# Patient Record
Sex: MALE | Race: WHITE | NOT HISPANIC OR LATINO | Employment: FULL TIME | URBAN - METROPOLITAN AREA
[De-identification: names, ages, dates, MRNs, and addresses within clinical notes are randomized per-mention and may not be internally consistent; named-entity substitution may affect disease eponyms.]

---

## 2024-02-10 ENCOUNTER — HOSPITAL ENCOUNTER (EMERGENCY)
Facility: MEDICAL CENTER | Age: 39
End: 2024-02-10
Attending: EMERGENCY MEDICINE

## 2024-02-10 ENCOUNTER — APPOINTMENT (OUTPATIENT)
Dept: RADIOLOGY | Facility: MEDICAL CENTER | Age: 39
End: 2024-02-10
Attending: EMERGENCY MEDICINE

## 2024-02-10 VITALS
DIASTOLIC BLOOD PRESSURE: 86 MMHG | BODY MASS INDEX: 24.18 KG/M2 | HEIGHT: 63 IN | SYSTOLIC BLOOD PRESSURE: 145 MMHG | WEIGHT: 136.47 LBS | RESPIRATION RATE: 20 BRPM | OXYGEN SATURATION: 94 % | HEART RATE: 66 BPM | TEMPERATURE: 98.8 F

## 2024-02-10 DIAGNOSIS — R51.9 ACUTE NONINTRACTABLE HEADACHE, UNSPECIFIED HEADACHE TYPE: Primary | ICD-10-CM

## 2024-02-10 LAB — GLUCOSE BLD STRIP.AUTO-MCNC: 89 MG/DL (ref 65–99)

## 2024-02-10 PROCEDURE — 82962 GLUCOSE BLOOD TEST: CPT

## 2024-02-10 PROCEDURE — 70450 CT HEAD/BRAIN W/O DYE: CPT

## 2024-02-10 PROCEDURE — 99283 EMERGENCY DEPT VISIT LOW MDM: CPT

## 2024-02-10 NOTE — ED TRIAGE NOTES
"Chief Complaint   Patient presents with    Headache     X3 hours, started off throbbing and then became sharp pain to R back of head, states numbness and tingling to all of body. +blurred vision. Denies dizziness and nausea. Denies recent injuries/medical hx.        Pt ambulated with steady gait to triage. Pt A&Ox4, on room air, (-) stroke scale in triage. Denies medication.     Pt to lobby . Pt educated on alerting staff in changes to condition. Pt verbalized understanding.     BP (!) 155/92   Pulse 76   Temp 36.3 °C (97.3 °F) (Temporal)   Resp 17   Ht 1.6 m (5' 3\")   Wt 61.9 kg (136 lb 7.4 oz)   SpO2 100%   BMI 24.17 kg/m²   s  "

## 2024-02-10 NOTE — ED PROVIDER NOTES
"  ER Provider Note    Scribed for Tonio Hampton Ii, M.d. by Praneeth Montes. 2/10/2024  3:47 AM    Primary Care Provider: No primary care provider noted    CHIEF COMPLAINT  Chief Complaint   Patient presents with    Headache     X3 hours, started off throbbing and then became sharp pain to R back of head, states numbness and tingling to all of body. +blurred vision. Denies dizziness and nausea. Denies recent injuries/medical hx.          HPI/ROS  LIMITATION TO HISTORY   Select: Language Thai,  Used [id number], [id number]  OUTSIDE HISTORIAN(S):  None    Shandra Villegas is a 38 y.o. male who presents to the ED for evaluation of headaches onset 6 PM. He was finishing work when he his pain began as a pulsing then at 7 PM his pain became constant. He notes his pain is not strong, but is a constant \"buzzing.\" He locates his pain to the right posterior of his head with associated blurred vision. He says he can still see but it looks different. No loss of vision or peripheral vision. 2 hours after his headache began to experience tingling across his entire body. He states that \"when he is walking he feels like he is not walking.\" The patient denies dizziness and nausea. The patient denies taking medication to treat his symptoms. He states he does not like to take medicine because of a pill he took 6 years ago to treat tooth pain that made him feel bad.     PAST MEDICAL HISTORY  Past Medical History:   Diagnosis Date    Patient denies medical problems      SURGICAL HISTORY  History reviewed. No pertinent surgical history.    FAMILY HISTORY  History reviewed. No pertinent family history.    SOCIAL HISTORY   reports that he has never smoked. He has never used smokeless tobacco. He reports that he does not drink alcohol and does not use drugs.    CURRENT MEDICATIONS  Previous Medications    Denies taking medication.      ALLERGIES  Patient has no known allergies.    PHYSICAL EXAM  BP (!) 144/93   Pulse 74   " "Temp 36.3 °C (97.3 °F) (Temporal)   Resp 17   Ht 1.6 m (5' 3\")   Wt 61.9 kg (136 lb 7.4 oz)   SpO2 98%   BMI 24.17 kg/m²   Physical Exam  Vitals and nursing note reviewed.   Constitutional:       Appearance: Normal appearance.   HENT:      Head: Normocephalic and atraumatic.      Comments: No reproducible tenderness at scalp     Nose: No congestion.      Mouth/Throat:      Mouth: Mucous membranes are moist.   Eyes:      Extraocular Movements: Extraocular movements intact.      Conjunctiva/sclera: Conjunctivae normal.      Pupils: Pupils are equal, round, and reactive to light.   Cardiovascular:      Rate and Rhythm: Normal rate and regular rhythm.   Pulmonary:      Effort: Pulmonary effort is normal.      Breath sounds: Normal breath sounds.   Abdominal:      General: There is no distension.   Musculoskeletal:      Cervical back: Normal range of motion. No rigidity.   Lymphadenopathy:      Cervical: No cervical adenopathy.   Neurological:      Mental Status: He is alert.      Comments: AOX4, clear speech, no aphasia, no facial droop. 5/5 strength in all extremities. Sensation intact. No truncal or peripheral ataxia.    Psychiatric:         Mood and Affect: Mood normal. Mood is not depressed.          DIAGNOSTIC STUDIES    Labs:   Results for orders placed or performed during the hospital encounter of 02/10/24   POCT glucose device results   Result Value Ref Range    POC Glucose, Blood 89 65 - 99 mg/dL      Radiology:   The attending emergency physician has independently interpreted the diagnostic imaging associated with this visit and am waiting the final reading from the radiologist.   Preliminary interpretation is a follows: CT-head no intracranial abnormality.   Radiologist interpretation:   CT-HEAD W/O   Final Result      No acute process.            COURSE & MEDICAL DECISION MAKING     ED Observation Status? No; Patient does not meet criteria for ED Observation.     Care Narrative:   3:56 AM - Patient seen " and examined at bedside. The patient is a 38 year old man who presents to the ED for evaluation of a headache onset 6 PM. The patient's neurological examination is unremarkable. The patient requests blood work, but I informed him this would likely be inconclusive. Blood sugar taken during evaluation by nursing that was normal (89). Discussed plan of care, including medication for pain and CT imaging to evaluate for any intracranial injury or abnormality. Likely benign headache but some limitations with . He insists on further testing, refuses medication for pain. Could be migraine and would be more reassuring to be able to treat as migraine and if resolved, this would be more substantial evidence that this headache is benign. Patient agrees to the plan of care. Ordered for CT-head w/o to evaluate his symptoms.      4:44 AM - Reviewed imaging at this time as noted above.     5:26 AM - I reevaluated the patient at bedside. He reports feeling improved at this time. I discussed the patient's diagnostic study results which show no evidence of intracranial injury or other abnormality. Vision checked and is acceptable. The patient was given the opportunity to ask questions and all were addressed at this time. I discussed plan for discharge and follow up as outlined below. The patient is stable for discharge at this time and will return for any new or worsening symptoms. Patient verbalizes understanding and support with my plan for discharge.      PROBLEM LIST  # Headache   -likely benign, CT normal, headache improving    DISPOSITION AND DISCUSSIONS  I have discussed management of the patient with the following physicians and CARRILLO's:  None    Discussion of management with other QHP or appropriate source(s): None     Barriers to care at this time, including but not limited to: Patient does not have established PCP.     The patient will return for new or worsening symptoms and is stable at the time of  discharge.    The patient is referred to a primary physician for blood pressure management, diabetic screening, and for all other preventative health concerns.    DISPOSITION:  Patient will be discharged home in stable condition.    FOLLOW UP:  [facility], Emergency Dept  [address] [address] [phone]    fever, worsening pain, vision loss or other serious concerns    OUTPATIENT MEDICATIONS:  There are no discharge medications for this patient.     FINAL DIANGOSIS  1. Acute nonintractable headache, unspecified headache type         I, Praneeth Montes (Norberto), am scribing for, and in the presence of, KACI Ontiveros II.    Electronically signed by: Praneeth Montes (Norberto), 2/10/2024    ITonio II, M* personally performed the services described in this documentation, as scribed by Praneeth Montes in my presence, and it is both accurate and complete.     The note accurately reflects work and decisions made by me.  Tonio Hampton II, M.D.  2/10/2024  7:13 AM

## 2024-06-05 ENCOUNTER — APPOINTMENT (OUTPATIENT)
Dept: RADIOLOGY | Facility: MEDICAL CENTER | Age: 39
End: 2024-06-05
Attending: EMERGENCY MEDICINE

## 2024-06-05 ENCOUNTER — HOSPITAL ENCOUNTER (EMERGENCY)
Facility: MEDICAL CENTER | Age: 39
End: 2024-06-05
Attending: EMERGENCY MEDICINE

## 2024-06-05 VITALS
RESPIRATION RATE: 16 BRPM | HEART RATE: 58 BPM | TEMPERATURE: 98.8 F | OXYGEN SATURATION: 97 % | HEIGHT: 63 IN | WEIGHT: 139.33 LBS | BODY MASS INDEX: 24.69 KG/M2 | DIASTOLIC BLOOD PRESSURE: 65 MMHG | SYSTOLIC BLOOD PRESSURE: 110 MMHG

## 2024-06-05 DIAGNOSIS — G43.009 ATYPICAL MIGRAINE: ICD-10-CM

## 2024-06-05 LAB
ALBUMIN SERPL BCP-MCNC: 4.5 G/DL (ref 3.2–4.9)
ALBUMIN/GLOB SERPL: 1.8 G/DL
ALP SERPL-CCNC: 73 U/L (ref 30–99)
ALT SERPL-CCNC: 22 U/L (ref 2–50)
ANION GAP SERPL CALC-SCNC: 14 MMOL/L (ref 7–16)
AST SERPL-CCNC: 30 U/L (ref 12–45)
BASOPHILS # BLD AUTO: 0.6 % (ref 0–1.8)
BASOPHILS # BLD: 0.03 K/UL (ref 0–0.12)
BILIRUB SERPL-MCNC: 0.4 MG/DL (ref 0.1–1.5)
BUN SERPL-MCNC: 21 MG/DL (ref 8–22)
CALCIUM ALBUM COR SERPL-MCNC: 9 MG/DL (ref 8.5–10.5)
CALCIUM SERPL-MCNC: 9.4 MG/DL (ref 8.5–10.5)
CHLORIDE SERPL-SCNC: 106 MMOL/L (ref 96–112)
CO2 SERPL-SCNC: 20 MMOL/L (ref 20–33)
CREAT SERPL-MCNC: 0.79 MG/DL (ref 0.5–1.4)
EKG IMPRESSION: NORMAL
EOSINOPHIL # BLD AUTO: 0.07 K/UL (ref 0–0.51)
EOSINOPHIL NFR BLD: 1.3 % (ref 0–6.9)
ERYTHROCYTE [DISTWIDTH] IN BLOOD BY AUTOMATED COUNT: 43.4 FL (ref 35.9–50)
GFR SERPLBLD CREATININE-BSD FMLA CKD-EPI: 118 ML/MIN/1.73 M 2
GLOBULIN SER CALC-MCNC: 2.5 G/DL (ref 1.9–3.5)
GLUCOSE SERPL-MCNC: 100 MG/DL (ref 65–99)
HCT VFR BLD AUTO: 45.7 % (ref 42–52)
HGB BLD-MCNC: 15.3 G/DL (ref 14–18)
IMM GRANULOCYTES # BLD AUTO: 0.02 K/UL (ref 0–0.11)
IMM GRANULOCYTES NFR BLD AUTO: 0.4 % (ref 0–0.9)
LYMPHOCYTES # BLD AUTO: 1.27 K/UL (ref 1–4.8)
LYMPHOCYTES NFR BLD: 24.1 % (ref 22–41)
MCH RBC QN AUTO: 30.5 PG (ref 27–33)
MCHC RBC AUTO-ENTMCNC: 33.5 G/DL (ref 32.3–36.5)
MCV RBC AUTO: 91.2 FL (ref 81.4–97.8)
MONOCYTES # BLD AUTO: 0.37 K/UL (ref 0–0.85)
MONOCYTES NFR BLD AUTO: 7 % (ref 0–13.4)
NEUTROPHILS # BLD AUTO: 3.5 K/UL (ref 1.82–7.42)
NEUTROPHILS NFR BLD: 66.6 % (ref 44–72)
NRBC # BLD AUTO: 0 K/UL
NRBC BLD-RTO: 0 /100 WBC (ref 0–0.2)
PLATELET # BLD AUTO: 209 K/UL (ref 164–446)
PMV BLD AUTO: 11 FL (ref 9–12.9)
POTASSIUM SERPL-SCNC: 4.1 MMOL/L (ref 3.6–5.5)
PROT SERPL-MCNC: 7 G/DL (ref 6–8.2)
RBC # BLD AUTO: 5.01 M/UL (ref 4.7–6.1)
SODIUM SERPL-SCNC: 140 MMOL/L (ref 135–145)
WBC # BLD AUTO: 5.3 K/UL (ref 4.8–10.8)

## 2024-06-05 PROCEDURE — 80053 COMPREHEN METABOLIC PANEL: CPT

## 2024-06-05 PROCEDURE — 99284 EMERGENCY DEPT VISIT MOD MDM: CPT

## 2024-06-05 PROCEDURE — 85025 COMPLETE CBC W/AUTO DIFF WBC: CPT

## 2024-06-05 PROCEDURE — 70450 CT HEAD/BRAIN W/O DYE: CPT

## 2024-06-05 PROCEDURE — 71045 X-RAY EXAM CHEST 1 VIEW: CPT

## 2024-06-05 PROCEDURE — 93005 ELECTROCARDIOGRAM TRACING: CPT | Performed by: EMERGENCY MEDICINE

## 2024-06-05 PROCEDURE — 36415 COLL VENOUS BLD VENIPUNCTURE: CPT

## 2024-06-05 NOTE — ED NOTES
Pt ambulatory to room. Visual acuity done on the way to the room. R - 20/50, L - 20/50, Both - 20/20. Pt states that the blurriness is intermittent. Pt connected to monitor and given call light.

## 2024-06-05 NOTE — ED PROVIDER NOTES
"CHIEF COMPLAINT  Chief Complaint   Patient presents with    Blurred Vision     Onset 1 hour ago, blurred vision to both eyes, also c/o tingling in mouth & sensation of throat \"tight\" & dry. No oral swelling hives. Airway clear.        LIMITATION TO HISTORY   Select: none    XAVIER Dickens is a 38 y.o. male who presents to the Emergency Department inning of blurred vision left arm versus right arm numbness and tingling and some perioral tingling.  The patient was at work today and he had an onset of just blurry vision to both eyes and started having a weird sensation in his mouth then started having some tingling to his left arm and some generalized weakness.  Patient became very concerned so he presented to the emergency department.  Upon arrival here he relates the above history states that his vision is improving but now he has a headache.  Denies any fevers chills nausea vomiting or the symptoms he is never really had any headaches like this before he just very concerned.  The patient presents with the above history denies any other symptoms.    OUTSIDE HISTORIAN(S):  Select: None    EXTERNAL RECORDS REVIEWED  Select: Other there are no significant records within the EMR      PAST MEDICAL HISTORY  Past Medical History:   Diagnosis Date    Patient denies medical problems      .    SURGICAL HISTORY  No past surgical history on file.      FAMILY HISTORY  No family history on file.       SOCIAL HISTORY  Social History     Socioeconomic History    Marital status: Single     Spouse name: Not on file    Number of children: Not on file    Years of education: Not on file    Highest education level: Not on file   Occupational History    Not on file   Tobacco Use    Smoking status: Never    Smokeless tobacco: Never   Vaping Use    Vaping status: Never Used   Substance and Sexual Activity    Alcohol use: Never    Drug use: Never    Sexual activity: Not on file   Other Topics Concern    Not on file   Social " "History Narrative    Not on file     Social Determinants of Health     Financial Resource Strain: Not on file   Food Insecurity: Not on file   Transportation Needs: Not on file   Physical Activity: Not on file   Stress: Not on file   Social Connections: Not on file   Intimate Partner Violence: Not on file   Housing Stability: Not on file         CURRENT MEDICATIONS  No current facility-administered medications on file prior to encounter.     No current outpatient medications on file prior to encounter.           ALLERGIES  No Known Allergies    PHYSICAL EXAM  VITAL SIGNS:/65   Pulse (!) 58   Temp 37.1 °C (98.8 °F) (Temporal)   Resp 16   Ht 1.6 m (5' 3\")   Wt 63.2 kg (139 lb 5.3 oz)   SpO2 97%   BMI 24.68 kg/m²     Constitutional:  Well-developed no acute distress   HENT: Normocephalic, Atraumatic, Bilateral external ears normal.  Eyes:  conjunctiva are normal.   Neck: Supple.  Nontender midline  Cardiovascular: Regular rate and rhythm without murmurs gallops or rubs.   Thorax & Lungs: No respiratory distress. Breathing comfortably. Lungs are clear to auscultation bilaterally, there are no wheezes no rales. Chest wall is nontender.  Abdomen: Soft, non distended, non tender   Skin: Warm, Dry, No erythema,   Back: No tenderness, No CVA tenderness.  Musculoskeletal: No clubbing cyanosis or edema good range of motion   Neurologic: Alert & oriented x 3, normal sensation moving all extremities appears normal Cranial nerves II-XII grossly intact, moving all 4 extremities, 5/5 strength in all 4 extremities, cerebellar functions intact, no other focal abnormalities.  NIH is 0  Psychiatric: Affect normal, Judgment normal, Mood normal.       DIAGNOSTIC STUDIES / PROCEDURES      LABS  Results for orders placed or performed during the hospital encounter of 06/05/24   CBC with Differential   Result Value Ref Range    WBC 5.3 4.8 - 10.8 K/uL    RBC 5.01 4.70 - 6.10 M/uL    Hemoglobin 15.3 14.0 - 18.0 g/dL    Hematocrit " 45.7 42.0 - 52.0 %    MCV 91.2 81.4 - 97.8 fL    MCH 30.5 27.0 - 33.0 pg    MCHC 33.5 32.3 - 36.5 g/dL    RDW 43.4 35.9 - 50.0 fL    Platelet Count 209 164 - 446 K/uL    MPV 11.0 9.0 - 12.9 fL    Neutrophils-Polys 66.60 44.00 - 72.00 %    Lymphocytes 24.10 22.00 - 41.00 %    Monocytes 7.00 0.00 - 13.40 %    Eosinophils 1.30 0.00 - 6.90 %    Basophils 0.60 0.00 - 1.80 %    Immature Granulocytes 0.40 0.00 - 0.90 %    Nucleated RBC 0.00 0.00 - 0.20 /100 WBC    Neutrophils (Absolute) 3.50 1.82 - 7.42 K/uL    Lymphs (Absolute) 1.27 1.00 - 4.80 K/uL    Monos (Absolute) 0.37 0.00 - 0.85 K/uL    Eos (Absolute) 0.07 0.00 - 0.51 K/uL    Baso (Absolute) 0.03 0.00 - 0.12 K/uL    Immature Granulocytes (abs) 0.02 0.00 - 0.11 K/uL    NRBC (Absolute) 0.00 K/uL   Complete Metabolic Panel (CMP)   Result Value Ref Range    Sodium 140 135 - 145 mmol/L    Potassium 4.1 3.6 - 5.5 mmol/L    Chloride 106 96 - 112 mmol/L    Co2 20 20 - 33 mmol/L    Anion Gap 14.0 7.0 - 16.0    Glucose 100 (H) 65 - 99 mg/dL    Bun 21 8 - 22 mg/dL    Creatinine 0.79 0.50 - 1.40 mg/dL    Calcium 9.4 8.5 - 10.5 mg/dL    Correct Calcium 9.0 8.5 - 10.5 mg/dL    AST(SGOT) 30 12 - 45 U/L    ALT(SGPT) 22 2 - 50 U/L    Alkaline Phosphatase 73 30 - 99 U/L    Total Bilirubin 0.4 0.1 - 1.5 mg/dL    Albumin 4.5 3.2 - 4.9 g/dL    Total Protein 7.0 6.0 - 8.2 g/dL    Globulin 2.5 1.9 - 3.5 g/dL    A-G Ratio 1.8 g/dL   ESTIMATED GFR   Result Value Ref Range    GFR (CKD-EPI) 118 >60 mL/min/1.73 m 2   EKG   Result Value Ref Range    Report       [facility] Emergency Dept.    Test Date:  2024  Pt Name:    CRISTOFER KELLY    Department: ER  MRN:        [id number]                      Room:       [facility]  Gender:     Male                         Technician: [id number]  :        1985                   Requested By:MARILEE LANDON  Order #:    [id number]                    Reading MD: MARILEE LANDON MD    Measurements  Intervals                                 Axis  Rate:       64                           P:          63  CT:         170                          QRS:        -18  QRSD:       103                          T:          49  QT:         399  QTc:        412    Interpretive Statements  Sinus rhythm  S1,S2,S3 pattern  ST elev, probable normal early repol pattern  No previous ECG available for comparison  Otherwise Normal ECG  Electronically Signed On 06- 14:03:45 PDT by MARILEE LANDON MD         EKG:   I have independently interpreted this EKG as detailed above.       RADIOLOGY  I have independently interpreted the diagnostic imaging associated with this visit and am waiting the final reading from the radiologist.   My preliminary interpretation is as follows: CT shows no signs of significant hemorrhage    Radiologist interpretation:  CT-HEAD W/O   Final Result      No acute intracranial abnormality.                  DX-CHEST-PORTABLE (1 VIEW)   Final Result      Negative single view of the chest.              COURSE & MEDICAL DECISION MAKING    ED COURSE:    ED Observation Status? No, the patient does not qualify for observation    INTERVENTIONS BY ME:  Medications - No data to display      Rechecks patient is improved after time      INITIAL ASSESSMENT, COURSE AND PLAN  Care Narrative: Patient presents for evaluation.  Clinically describes that he had some visual blurriness and then left arm tingling and numbness concerns were for intercerebral problems so I did do a CT scan of the head as well as laboratory studies.  They were all completely normal.  The patient's symptoms then turned into a headache as he was describing I do feel is most likely a complex migraine headache.  I explained to the patient about what this is.  He does not wish to take any medications because he states that medications make him upset in his stomach.  I did recommend that if he needed it he could start with Excedrin over-the-counter but at this point I do not  find any significant abnormalities and I recommended for him to follow-up with her primary care physician for recheck as needed.  I explained certain medications for him and at this point he does not wish to have any medications.       ADDITIONAL PROBLEM LIST  None  DISPOSITION AND DISCUSSIONS  I have discussed management of the patient with the following physicians and CARRILLO's: None    Escalation of care considered, and ultimately not performed: None    Barriers to care at this time, including but not limited to: Patient does not have established PCP.     Decision tools and prescription drugs considered including, but not limited to: Pain Medications patient may use OTC medications such as Excedrin to help with his migraine headache. .    FINAL DIAGNOSIS  1. Atypical migraine           The patient will return for new or worsening symptoms and is stable at the time of discharge.    The patient is referred to a primary physician for blood pressure management, diabetic screening, and for all other preventative health concerns.    I reviewed prescription monitoring program for patient's narcotic use before prescribing a scheduled drug.The patient will not drink alcohol nor drive with prescribed medications        DISPOSITION:  Patient will be discharged home in stable condition.    FOLLOW UP:  [facility] ([facility]) - Primary Care and Family Medicine  [facility] [facility] [id number] [phone]        [facility] - [facility]al [facility]  [address] [facility] [address]  [phone]          OUTPATIENT MEDICATIONS:  There are no discharge medications for this patient.                   Electronically signed by: Lavon Lopez M.D.,4:10 PM 06/05/24

## 2024-06-05 NOTE — ED TRIAGE NOTES
"Shandra Dickens  38 y.o.  male  Chief Complaint   Patient presents with    Blurred Vision     Onset 1 hour ago, blurred vision to both eyes, also c/o tingling in mouth & sensation of throat \"tight\" & dry. No oral swelling hives. Airway clear.      [place] virtual  used for triage. Patient educated on triage process, to alert staff if any changes in condition.    "

## 2024-06-11 ENCOUNTER — HOSPITAL ENCOUNTER (EMERGENCY)
Facility: MEDICAL CENTER | Age: 39
End: 2024-06-11
Attending: STUDENT IN AN ORGANIZED HEALTH CARE EDUCATION/TRAINING PROGRAM

## 2024-06-11 ENCOUNTER — APPOINTMENT (OUTPATIENT)
Dept: RADIOLOGY | Facility: MEDICAL CENTER | Age: 39
End: 2024-06-11
Attending: STUDENT IN AN ORGANIZED HEALTH CARE EDUCATION/TRAINING PROGRAM

## 2024-06-11 VITALS
WEIGHT: 127.87 LBS | TEMPERATURE: 97.3 F | RESPIRATION RATE: 17 BRPM | HEART RATE: 63 BPM | SYSTOLIC BLOOD PRESSURE: 137 MMHG | DIASTOLIC BLOOD PRESSURE: 92 MMHG | BODY MASS INDEX: 22.66 KG/M2 | OXYGEN SATURATION: 99 % | HEIGHT: 63 IN

## 2024-06-11 DIAGNOSIS — R07.9 CHEST PAIN, UNSPECIFIED TYPE: ICD-10-CM

## 2024-06-11 LAB
ALBUMIN SERPL BCP-MCNC: 4.9 G/DL (ref 3.2–4.9)
ALBUMIN/GLOB SERPL: 1.9 G/DL
ALP SERPL-CCNC: 74 U/L (ref 30–99)
ALT SERPL-CCNC: 21 U/L (ref 2–50)
ANION GAP SERPL CALC-SCNC: 15 MMOL/L (ref 7–16)
AST SERPL-CCNC: 18 U/L (ref 12–45)
BASOPHILS # BLD AUTO: 0.8 % (ref 0–1.8)
BASOPHILS # BLD: 0.04 K/UL (ref 0–0.12)
BILIRUB SERPL-MCNC: 0.5 MG/DL (ref 0.1–1.5)
BUN SERPL-MCNC: 20 MG/DL (ref 8–22)
CALCIUM ALBUM COR SERPL-MCNC: 8.7 MG/DL (ref 8.5–10.5)
CALCIUM SERPL-MCNC: 9.4 MG/DL (ref 8.5–10.5)
CHLORIDE SERPL-SCNC: 104 MMOL/L (ref 96–112)
CO2 SERPL-SCNC: 20 MMOL/L (ref 20–33)
CREAT SERPL-MCNC: 0.71 MG/DL (ref 0.5–1.4)
EKG IMPRESSION: NORMAL
EOSINOPHIL # BLD AUTO: 0.1 K/UL (ref 0–0.51)
EOSINOPHIL NFR BLD: 2.1 % (ref 0–6.9)
ERYTHROCYTE [DISTWIDTH] IN BLOOD BY AUTOMATED COUNT: 43.7 FL (ref 35.9–50)
GFR SERPLBLD CREATININE-BSD FMLA CKD-EPI: 122 ML/MIN/1.73 M 2
GLOBULIN SER CALC-MCNC: 2.6 G/DL (ref 1.9–3.5)
GLUCOSE SERPL-MCNC: 96 MG/DL (ref 65–99)
HCT VFR BLD AUTO: 48.7 % (ref 42–52)
HGB BLD-MCNC: 16.6 G/DL (ref 14–18)
IMM GRANULOCYTES # BLD AUTO: 0.03 K/UL (ref 0–0.11)
IMM GRANULOCYTES NFR BLD AUTO: 0.6 % (ref 0–0.9)
LIPASE SERPL-CCNC: 26 U/L (ref 11–82)
LYMPHOCYTES # BLD AUTO: 1.51 K/UL (ref 1–4.8)
LYMPHOCYTES NFR BLD: 32 % (ref 22–41)
MCH RBC QN AUTO: 30.7 PG (ref 27–33)
MCHC RBC AUTO-ENTMCNC: 34.1 G/DL (ref 32.3–36.5)
MCV RBC AUTO: 90 FL (ref 81.4–97.8)
MONOCYTES # BLD AUTO: 0.52 K/UL (ref 0–0.85)
MONOCYTES NFR BLD AUTO: 11 % (ref 0–13.4)
NEUTROPHILS # BLD AUTO: 2.52 K/UL (ref 1.82–7.42)
NEUTROPHILS NFR BLD: 53.5 % (ref 44–72)
NRBC # BLD AUTO: 0 K/UL
NRBC BLD-RTO: 0 /100 WBC (ref 0–0.2)
PLATELET # BLD AUTO: 204 K/UL (ref 164–446)
PMV BLD AUTO: 11 FL (ref 9–12.9)
POTASSIUM SERPL-SCNC: 3.5 MMOL/L (ref 3.6–5.5)
PROT SERPL-MCNC: 7.5 G/DL (ref 6–8.2)
RBC # BLD AUTO: 5.41 M/UL (ref 4.7–6.1)
SODIUM SERPL-SCNC: 139 MMOL/L (ref 135–145)
TROPONIN T SERPL-MCNC: <6 NG/L (ref 6–19)
WBC # BLD AUTO: 4.7 K/UL (ref 4.8–10.8)

## 2024-06-11 PROCEDURE — 99284 EMERGENCY DEPT VISIT MOD MDM: CPT

## 2024-06-11 PROCEDURE — A9270 NON-COVERED ITEM OR SERVICE: HCPCS | Performed by: STUDENT IN AN ORGANIZED HEALTH CARE EDUCATION/TRAINING PROGRAM

## 2024-06-11 PROCEDURE — 700102 HCHG RX REV CODE 250 W/ 637 OVERRIDE(OP): Performed by: STUDENT IN AN ORGANIZED HEALTH CARE EDUCATION/TRAINING PROGRAM

## 2024-06-11 PROCEDURE — 83690 ASSAY OF LIPASE: CPT

## 2024-06-11 PROCEDURE — 700111 HCHG RX REV CODE 636 W/ 250 OVERRIDE (IP): Mod: JZ | Performed by: STUDENT IN AN ORGANIZED HEALTH CARE EDUCATION/TRAINING PROGRAM

## 2024-06-11 PROCEDURE — 93005 ELECTROCARDIOGRAM TRACING: CPT

## 2024-06-11 PROCEDURE — 93005 ELECTROCARDIOGRAM TRACING: CPT | Performed by: STUDENT IN AN ORGANIZED HEALTH CARE EDUCATION/TRAINING PROGRAM

## 2024-06-11 PROCEDURE — 71045 X-RAY EXAM CHEST 1 VIEW: CPT

## 2024-06-11 PROCEDURE — 96374 THER/PROPH/DIAG INJ IV PUSH: CPT

## 2024-06-11 PROCEDURE — 36415 COLL VENOUS BLD VENIPUNCTURE: CPT

## 2024-06-11 PROCEDURE — 80053 COMPREHEN METABOLIC PANEL: CPT

## 2024-06-11 PROCEDURE — 84484 ASSAY OF TROPONIN QUANT: CPT

## 2024-06-11 PROCEDURE — 85025 COMPLETE CBC W/AUTO DIFF WBC: CPT

## 2024-06-11 RX ORDER — KETOROLAC TROMETHAMINE 15 MG/ML
15 INJECTION, SOLUTION INTRAMUSCULAR; INTRAVENOUS ONCE
Status: COMPLETED | OUTPATIENT
Start: 2024-06-11 | End: 2024-06-11

## 2024-06-11 RX ADMIN — KETOROLAC TROMETHAMINE 15 MG: 15 INJECTION, SOLUTION INTRAMUSCULAR; INTRAVENOUS at 04:56

## 2024-06-11 RX ADMIN — LIDOCAINE HYDROCHLORIDE 15 ML: 20 SOLUTION OROPHARYNGEAL at 04:56

## 2024-06-11 ASSESSMENT — FIBROSIS 4 INDEX: FIB4 SCORE: 1.1

## 2024-06-11 ASSESSMENT — PAIN DESCRIPTION - PAIN TYPE: TYPE: ACUTE PAIN

## 2024-06-11 NOTE — ED PROVIDER NOTES
"ED Provider Note    CHIEF COMPLAINT  Chief Complaint   Patient presents with    Chest Pain     Pain radiates around entire torso: back, chest, rib cage & flanks. Described as \"hot\" and eyes are burning. 7:10. Onset x 3 hours at rest. - SOB, - N/V. Pt had similar event 6/5 and was seen at ER.        EXTERNAL RECORDS REVIEWED  Seen in the ER on 5 June for atypical migraine.  He presented with blurred vision and some right arm numbness.    HPI/ROS  LIMITATION TO HISTORY   Select: Language Yakut,  Used   OUTSIDE HISTORIAN(S):  None    Shandra Dickens is a 38 y.o. male who presents with chest and back pain.  Patient says it awoke him from sleep around 1:00 in the morning.  He also has an associated headache which started about an hour ago.  He describes it as radiating throughout his back, his chest, rib cage and flank.  He describes it as a hot sensation.  He denies any onset or worsening with exertion.  No associated diaphoresis, nausea, vomiting or shortness of breath.  Patient denies any recent illness.  No hemoptysis, leg swelling or history of DVT or PE.  He denies any pleuritic component.  No positional changes in pain.  Reports episodes of pain like this before typically resolve spontaneously after about 8 hours.    PAST MEDICAL HISTORY   has a past medical history of Patient denies medical problems.    SURGICAL HISTORY  patient denies any surgical history    FAMILY HISTORY  History reviewed. No pertinent family history.    SOCIAL HISTORY  Social History     Tobacco Use    Smoking status: Never    Smokeless tobacco: Never   Vaping Use    Vaping status: Never Used   Substance and Sexual Activity    Alcohol use: Never    Drug use: Never    Sexual activity: Not on file       CURRENT MEDICATIONS  Home Medications       Reviewed by Mariella Barboza R.N. (Registered Nurse) on 06/11/24 at 0243  Med List Status: Partial     Medication Last Dose Status        Patient Jacky Taking any Medications       " "                    ALLERGIES  No Known Allergies    PHYSICAL EXAM  VITAL SIGNS: BP (!) 137/92   Pulse 63   Temp 36.3 °C (97.3 °F) (Temporal)   Resp 17   Ht 1.6 m (5' 3\")   Wt 58 kg (127 lb 13.9 oz)   SpO2 99%   BMI 22.65 kg/m²    Constitutional: Awake and alert . Non toxic  HENT: Normal inspection    Eyes: Normal inspection  Neck: Grossly normal range of motion.  Cardiovascular: Normal heart rate, Normal rhythm.  Symmetric peripheral pulses.   Thorax & Lungs: No respiratory distress, No wheezing, No rales, No rhonchi, No chest tenderness.   Abdomen: Soft, non-distended, nontender to palpation in all 4 quadrants, no mass  Skin: No obvious rash.  Extremities: Warm, well perfused. No clubbing, cyanosis, edema   Neurologic: Grossly normal   Psychiatric: Normal for situation      EKG/LABS  Results for orders placed or performed during the hospital encounter of 06/11/24   CBC WITH DIFFERENTIAL   Result Value Ref Range    WBC 4.7 (L) 4.8 - 10.8 K/uL    RBC 5.41 4.70 - 6.10 M/uL    Hemoglobin 16.6 14.0 - 18.0 g/dL    Hematocrit 48.7 42.0 - 52.0 %    MCV 90.0 81.4 - 97.8 fL    MCH 30.7 27.0 - 33.0 pg    MCHC 34.1 32.3 - 36.5 g/dL    RDW 43.7 35.9 - 50.0 fL    Platelet Count 204 164 - 446 K/uL    MPV 11.0 9.0 - 12.9 fL    Neutrophils-Polys 53.50 44.00 - 72.00 %    Lymphocytes 32.00 22.00 - 41.00 %    Monocytes 11.00 0.00 - 13.40 %    Eosinophils 2.10 0.00 - 6.90 %    Basophils 0.80 0.00 - 1.80 %    Immature Granulocytes 0.60 0.00 - 0.90 %    Nucleated RBC 0.00 0.00 - 0.20 /100 WBC    Neutrophils (Absolute) 2.52 1.82 - 7.42 K/uL    Lymphs (Absolute) 1.51 1.00 - 4.80 K/uL    Monos (Absolute) 0.52 0.00 - 0.85 K/uL    Eos (Absolute) 0.10 0.00 - 0.51 K/uL    Baso (Absolute) 0.04 0.00 - 0.12 K/uL    Immature Granulocytes (abs) 0.03 0.00 - 0.11 K/uL    NRBC (Absolute) 0.00 K/uL   COMP METABOLIC PANEL   Result Value Ref Range    Sodium 139 135 - 145 mmol/L    Potassium 3.5 (L) 3.6 - 5.5 mmol/L    Chloride 104 96 - 112 mmol/L "    Co2 20 20 - 33 mmol/L    Anion Gap 15.0 7.0 - 16.0    Glucose 96 65 - 99 mg/dL    Bun 20 8 - 22 mg/dL    Creatinine 0.71 0.50 - 1.40 mg/dL    Calcium 9.4 8.5 - 10.5 mg/dL    Correct Calcium 8.7 8.5 - 10.5 mg/dL    AST(SGOT) 18 12 - 45 U/L    ALT(SGPT) 21 2 - 50 U/L    Alkaline Phosphatase 74 30 - 99 U/L    Total Bilirubin 0.5 0.1 - 1.5 mg/dL    Albumin 4.9 3.2 - 4.9 g/dL    Total Protein 7.5 6.0 - 8.2 g/dL    Globulin 2.6 1.9 - 3.5 g/dL    A-G Ratio 1.9 g/dL   TROPONIN   Result Value Ref Range    Troponin T <6 6 - 19 ng/L   LIPASE   Result Value Ref Range    Lipase 26 11 - 82 U/L   ESTIMATED GFR   Result Value Ref Range    GFR (CKD-EPI) 122 >60 mL/min/1.73 m 2   EKG   Result Value Ref Range    Report       [facility]    Test Date:  2024  Pt Name:    CRISTOFER KELLY    Department: ER  MRN:        [id number]                      Room:  Gender:     Male                         Technician: [id number]  :        1985                   Requested By:ER TRIAGE PROTOCOL  Order #:    [id number]                    Reading MD: Andria Urban    Measurements  Intervals                                Axis  Rate:       69                           P:          81  IL:         164                          QRS:        -58  QRSD:       104                          T:          38  QT:         402  QTc:        431    Interpretive Statements  Sinus rhythm  LAD, consider left anterior fascicular block    Compared to ECG 2024 13:54:13  No significant changes  Electronically Signed On 2024 04:09:55 PDT by Andria Urban         I have independently interpreted this EKG    RADIOLOGY/PROCEDURES   I have independently interpreted the diagnostic imaging associated with this visit and am waiting the final reading from the radiologist.   My preliminary interpretation is as follows: No widened mediastinum     Radiologist interpretation:  DX-CHEST-PORTABLE (1 VIEW)   Final Result          1.  No acute cardiopulmonary disease.          COURSE & MEDICAL DECISION MAKING    ASSESSMENT, COURSE AND PLAN  Care Narrative: This is a 38-year-old who presents with chest and back pain.  On arrival he is afebrile, hemodynamically stable.  He is overall well-appearing.  EKG without ischemia or dysrhythmia.  He is very low risk for ACS, with a normal troponin and normal EKG I have very low suspicion.  HEART score 0. No indication for serial troponins given symptom onset over 2 hours from onset of pain.  I doubt aortic dissection and no significant hypertension, no neurologic deficit and no widened mediastinum on x-ray.  He has no risk factors for PE, PERC negative and no indication for further workup of this.  No recent illness to suggest pericarditis.  I doubt myocarditis with a normal troponin.  He has no signs of pneumonia, pneumothorax or pulmonary edema on x-ray.  Patient was given IV Toradol and a GI cocktail.  After this he reports significant improvement could be musculoskeletal, also considered GERD.  Patient understands that he should follow-up with PCP for further workup and should return to the ER for any persistent or worsening symptoms.  All interactions performed with the use of a .          DISPOSITION AND DISCUSSIONS  I have discussed management of the patient with the following physicians and CARRILLO's:  None    Discussion of management with other Q or appropriate source(s): None     Escalation of care considered, and ultimately not performed:None    Barriers to care at this time, including but not limited to: He has no PCP    Decision tools and prescription drugs considered including, but not limited to: PERC rule negative .    FINAL DIAGNOSIS  1. Chest pain, unspecified type Acute          Electronically signed by: Andria Urban M.D., 6/11/2024 4:03 AM

## 2024-06-11 NOTE — ED TRIAGE NOTES
"Chief Complaint   Patient presents with    Chest Pain     Pain radiates around entire torso: back, chest, rib cage & flanks. Described as \"hot\" and eyes are burning. 7:10. Onset x 3 hours at rest. - SOB, - N/V. Pt had similar event 6/5 and was seen at ER.      During home safety screening pt reports he has been having difficulty with neighbors and been having family issues and would like to speak to someone about it.     Pt ambulates from lobby with steady gait. Skin signs pink, warm, dry. Pt speaking full sentences, A & O x 4. Respirations equal and unlabored. Pt educated on triage process and to alert staff of any changing conditions. Pt returned to the lobby no apparent distress.     /82   Pulse 70   Temp 36.3 °C (97.3 °F) (Temporal)   Resp 18   Ht 1.6 m (5' 3\")   Wt 58 kg (127 lb 13.9 oz)   SpO2 97%   BMI 22.65 kg/m²       "

## 2024-06-30 ENCOUNTER — HOSPITAL ENCOUNTER (EMERGENCY)
Facility: MEDICAL CENTER | Age: 39
End: 2024-06-30
Attending: EMERGENCY MEDICINE

## 2024-06-30 ENCOUNTER — APPOINTMENT (OUTPATIENT)
Dept: RADIOLOGY | Facility: MEDICAL CENTER | Age: 39
End: 2024-06-30
Attending: EMERGENCY MEDICINE

## 2024-06-30 VITALS
TEMPERATURE: 97.4 F | HEART RATE: 59 BPM | DIASTOLIC BLOOD PRESSURE: 83 MMHG | OXYGEN SATURATION: 95 % | RESPIRATION RATE: 18 BRPM | BODY MASS INDEX: 22.85 KG/M2 | WEIGHT: 128.97 LBS | HEIGHT: 63 IN | SYSTOLIC BLOOD PRESSURE: 112 MMHG

## 2024-06-30 DIAGNOSIS — R10.84 GENERALIZED ABDOMINAL PAIN: ICD-10-CM

## 2024-06-30 LAB
ALBUMIN SERPL BCP-MCNC: 4.7 G/DL (ref 3.2–4.9)
ALBUMIN/GLOB SERPL: 1.7 G/DL
ALP SERPL-CCNC: 76 U/L (ref 30–99)
ALT SERPL-CCNC: 25 U/L (ref 2–50)
ANION GAP SERPL CALC-SCNC: 16 MMOL/L (ref 7–16)
APPEARANCE UR: CLEAR
AST SERPL-CCNC: 19 U/L (ref 12–45)
BASOPHILS # BLD AUTO: 0.8 % (ref 0–1.8)
BASOPHILS # BLD: 0.05 K/UL (ref 0–0.12)
BILIRUB SERPL-MCNC: 0.5 MG/DL (ref 0.1–1.5)
BILIRUB UR QL STRIP.AUTO: NEGATIVE
BUN SERPL-MCNC: 22 MG/DL (ref 8–22)
CALCIUM ALBUM COR SERPL-MCNC: 9.1 MG/DL (ref 8.5–10.5)
CALCIUM SERPL-MCNC: 9.7 MG/DL (ref 8.5–10.5)
CHLORIDE SERPL-SCNC: 109 MMOL/L (ref 96–112)
CO2 SERPL-SCNC: 17 MMOL/L (ref 20–33)
COLOR UR: YELLOW
CREAT SERPL-MCNC: 0.79 MG/DL (ref 0.5–1.4)
EKG IMPRESSION: NORMAL
EOSINOPHIL # BLD AUTO: 0.06 K/UL (ref 0–0.51)
EOSINOPHIL NFR BLD: 0.9 % (ref 0–6.9)
ERYTHROCYTE [DISTWIDTH] IN BLOOD BY AUTOMATED COUNT: 43 FL (ref 35.9–50)
GFR SERPLBLD CREATININE-BSD FMLA CKD-EPI: 118 ML/MIN/1.73 M 2
GLOBULIN SER CALC-MCNC: 2.7 G/DL (ref 1.9–3.5)
GLUCOSE SERPL-MCNC: 95 MG/DL (ref 65–99)
GLUCOSE UR STRIP.AUTO-MCNC: NEGATIVE MG/DL
HCT VFR BLD AUTO: 44.1 % (ref 42–52)
HGB BLD-MCNC: 15.7 G/DL (ref 14–18)
IMM GRANULOCYTES # BLD AUTO: 0.02 K/UL (ref 0–0.11)
IMM GRANULOCYTES NFR BLD AUTO: 0.3 % (ref 0–0.9)
KETONES UR STRIP.AUTO-MCNC: ABNORMAL MG/DL
LACTATE SERPL-SCNC: 0.9 MMOL/L (ref 0.5–2)
LEUKOCYTE ESTERASE UR QL STRIP.AUTO: NEGATIVE
LIPASE SERPL-CCNC: 29 U/L (ref 11–82)
LYMPHOCYTES # BLD AUTO: 1.91 K/UL (ref 1–4.8)
LYMPHOCYTES NFR BLD: 29.6 % (ref 22–41)
MCH RBC QN AUTO: 31.5 PG (ref 27–33)
MCHC RBC AUTO-ENTMCNC: 35.6 G/DL (ref 32.3–36.5)
MCV RBC AUTO: 88.6 FL (ref 81.4–97.8)
MICRO URNS: ABNORMAL
MONOCYTES # BLD AUTO: 0.41 K/UL (ref 0–0.85)
MONOCYTES NFR BLD AUTO: 6.3 % (ref 0–13.4)
NEUTROPHILS # BLD AUTO: 4.01 K/UL (ref 1.82–7.42)
NEUTROPHILS NFR BLD: 62.1 % (ref 44–72)
NITRITE UR QL STRIP.AUTO: NEGATIVE
NRBC # BLD AUTO: 0 K/UL
NRBC BLD-RTO: 0 /100 WBC (ref 0–0.2)
PH UR STRIP.AUTO: 5.5 [PH] (ref 5–8)
PLATELET # BLD AUTO: 220 K/UL (ref 164–446)
PMV BLD AUTO: 10.6 FL (ref 9–12.9)
POTASSIUM SERPL-SCNC: 3.7 MMOL/L (ref 3.6–5.5)
PROT SERPL-MCNC: 7.4 G/DL (ref 6–8.2)
PROT UR QL STRIP: NEGATIVE MG/DL
RBC # BLD AUTO: 4.98 M/UL (ref 4.7–6.1)
RBC UR QL AUTO: NEGATIVE
SODIUM SERPL-SCNC: 142 MMOL/L (ref 135–145)
SP GR UR STRIP.AUTO: 1.03
TROPONIN T SERPL-MCNC: <6 NG/L (ref 6–19)
UROBILINOGEN UR STRIP.AUTO-MCNC: 0.2 MG/DL
WBC # BLD AUTO: 6.5 K/UL (ref 4.8–10.8)

## 2024-06-30 PROCEDURE — 93005 ELECTROCARDIOGRAM TRACING: CPT | Performed by: EMERGENCY MEDICINE

## 2024-06-30 PROCEDURE — 93005 ELECTROCARDIOGRAM TRACING: CPT

## 2024-06-30 PROCEDURE — 36415 COLL VENOUS BLD VENIPUNCTURE: CPT

## 2024-06-30 PROCEDURE — 71045 X-RAY EXAM CHEST 1 VIEW: CPT

## 2024-06-30 PROCEDURE — 83605 ASSAY OF LACTIC ACID: CPT

## 2024-06-30 PROCEDURE — 83690 ASSAY OF LIPASE: CPT

## 2024-06-30 PROCEDURE — 80053 COMPREHEN METABOLIC PANEL: CPT

## 2024-06-30 PROCEDURE — 81003 URINALYSIS AUTO W/O SCOPE: CPT

## 2024-06-30 PROCEDURE — 84484 ASSAY OF TROPONIN QUANT: CPT

## 2024-06-30 PROCEDURE — 99285 EMERGENCY DEPT VISIT HI MDM: CPT

## 2024-06-30 PROCEDURE — 700117 HCHG RX CONTRAST REV CODE 255: Performed by: EMERGENCY MEDICINE

## 2024-06-30 PROCEDURE — 74177 CT ABD & PELVIS W/CONTRAST: CPT

## 2024-06-30 PROCEDURE — 85025 COMPLETE CBC W/AUTO DIFF WBC: CPT

## 2024-06-30 RX ADMIN — IOHEXOL 100 ML: 350 INJECTION, SOLUTION INTRAVENOUS at 16:57

## 2024-06-30 ASSESSMENT — FIBROSIS 4 INDEX: FIB4 SCORE: 0.69

## 2024-07-09 ENCOUNTER — HOSPITAL ENCOUNTER (EMERGENCY)
Facility: MEDICAL CENTER | Age: 39
End: 2024-07-09
Attending: EMERGENCY MEDICINE

## 2024-07-09 ENCOUNTER — APPOINTMENT (OUTPATIENT)
Dept: RADIOLOGY | Facility: MEDICAL CENTER | Age: 39
End: 2024-07-09
Attending: EMERGENCY MEDICINE

## 2024-07-09 VITALS
SYSTOLIC BLOOD PRESSURE: 141 MMHG | BODY MASS INDEX: 25.16 KG/M2 | HEART RATE: 65 BPM | OXYGEN SATURATION: 96 % | TEMPERATURE: 97 F | RESPIRATION RATE: 16 BRPM | HEIGHT: 63 IN | DIASTOLIC BLOOD PRESSURE: 94 MMHG | WEIGHT: 141.98 LBS

## 2024-07-09 DIAGNOSIS — R51.9 ACUTE NONINTRACTABLE HEADACHE, UNSPECIFIED HEADACHE TYPE: ICD-10-CM

## 2024-07-09 DIAGNOSIS — R10.32 LEFT GROIN PAIN: ICD-10-CM

## 2024-07-09 LAB
APPEARANCE UR: CLEAR
BILIRUB UR QL STRIP.AUTO: NEGATIVE
C TRACH DNA SPEC QL NAA+PROBE: NEGATIVE
COLOR UR: YELLOW
GLUCOSE UR STRIP.AUTO-MCNC: NEGATIVE MG/DL
KETONES UR STRIP.AUTO-MCNC: NEGATIVE MG/DL
LEUKOCYTE ESTERASE UR QL STRIP.AUTO: NEGATIVE
MICRO URNS: NORMAL
N GONORRHOEA DNA SPEC QL NAA+PROBE: NEGATIVE
NITRITE UR QL STRIP.AUTO: NEGATIVE
PH UR STRIP.AUTO: 7 [PH] (ref 5–8)
PROT UR QL STRIP: NEGATIVE MG/DL
RBC UR QL AUTO: NEGATIVE
SP GR UR STRIP.AUTO: 1.01
SPECIMEN SOURCE: NORMAL
UROBILINOGEN UR STRIP.AUTO-MCNC: 0.2 MG/DL

## 2024-07-09 PROCEDURE — 76870 US EXAM SCROTUM: CPT

## 2024-07-09 PROCEDURE — 87491 CHLMYD TRACH DNA AMP PROBE: CPT

## 2024-07-09 PROCEDURE — 700111 HCHG RX REV CODE 636 W/ 250 OVERRIDE (IP): Mod: JZ | Performed by: EMERGENCY MEDICINE

## 2024-07-09 PROCEDURE — 96372 THER/PROPH/DIAG INJ SC/IM: CPT

## 2024-07-09 PROCEDURE — 99283 EMERGENCY DEPT VISIT LOW MDM: CPT

## 2024-07-09 PROCEDURE — 81003 URINALYSIS AUTO W/O SCOPE: CPT

## 2024-07-09 PROCEDURE — 87591 N.GONORRHOEAE DNA AMP PROB: CPT

## 2024-07-09 RX ORDER — HALOPERIDOL 5 MG/ML
2.5 INJECTION INTRAMUSCULAR ONCE
Status: COMPLETED | OUTPATIENT
Start: 2024-07-09 | End: 2024-07-09

## 2024-07-09 RX ADMIN — HALOPERIDOL LACTATE 2.5 MG: 5 INJECTION, SOLUTION INTRAMUSCULAR at 03:10

## 2024-07-09 ASSESSMENT — FIBROSIS 4 INDEX: FIB4 SCORE: 0.62

## 2024-08-03 ENCOUNTER — HOSPITAL ENCOUNTER (EMERGENCY)
Facility: MEDICAL CENTER | Age: 39
End: 2024-08-03
Attending: STUDENT IN AN ORGANIZED HEALTH CARE EDUCATION/TRAINING PROGRAM

## 2024-08-03 ENCOUNTER — APPOINTMENT (OUTPATIENT)
Dept: RADIOLOGY | Facility: MEDICAL CENTER | Age: 39
End: 2024-08-03
Attending: STUDENT IN AN ORGANIZED HEALTH CARE EDUCATION/TRAINING PROGRAM

## 2024-08-03 VITALS
SYSTOLIC BLOOD PRESSURE: 115 MMHG | TEMPERATURE: 98 F | HEART RATE: 60 BPM | HEIGHT: 63 IN | BODY MASS INDEX: 24.3 KG/M2 | RESPIRATION RATE: 18 BRPM | DIASTOLIC BLOOD PRESSURE: 81 MMHG | WEIGHT: 137.13 LBS | OXYGEN SATURATION: 97 %

## 2024-08-03 DIAGNOSIS — R20.2 PARESTHESIAS: ICD-10-CM

## 2024-08-03 LAB
ALBUMIN SERPL BCP-MCNC: 4.5 G/DL (ref 3.2–4.9)
ALBUMIN/GLOB SERPL: 1.7 G/DL
ALP SERPL-CCNC: 77 U/L (ref 30–99)
ALT SERPL-CCNC: 15 U/L (ref 2–50)
ANION GAP SERPL CALC-SCNC: 14 MMOL/L (ref 7–16)
AST SERPL-CCNC: 19 U/L (ref 12–45)
BASOPHILS # BLD AUTO: 0.5 % (ref 0–1.8)
BASOPHILS # BLD: 0.03 K/UL (ref 0–0.12)
BILIRUB SERPL-MCNC: 0.5 MG/DL (ref 0.1–1.5)
BUN SERPL-MCNC: 22 MG/DL (ref 8–22)
CALCIUM ALBUM COR SERPL-MCNC: 9.2 MG/DL (ref 8.5–10.5)
CALCIUM SERPL-MCNC: 9.6 MG/DL (ref 8.5–10.5)
CHLORIDE SERPL-SCNC: 106 MMOL/L (ref 96–112)
CO2 SERPL-SCNC: 19 MMOL/L (ref 20–33)
CREAT SERPL-MCNC: 0.73 MG/DL (ref 0.5–1.4)
EOSINOPHIL # BLD AUTO: 0.16 K/UL (ref 0–0.51)
EOSINOPHIL NFR BLD: 2.7 % (ref 0–6.9)
ERYTHROCYTE [DISTWIDTH] IN BLOOD BY AUTOMATED COUNT: 41.6 FL (ref 35.9–50)
GFR SERPLBLD CREATININE-BSD FMLA CKD-EPI: 120 ML/MIN/1.73 M 2
GLOBULIN SER CALC-MCNC: 2.7 G/DL (ref 1.9–3.5)
GLUCOSE SERPL-MCNC: 99 MG/DL (ref 65–99)
HCT VFR BLD AUTO: 46 % (ref 42–52)
HGB BLD-MCNC: 15.9 G/DL (ref 14–18)
IMM GRANULOCYTES # BLD AUTO: 0.02 K/UL (ref 0–0.11)
IMM GRANULOCYTES NFR BLD AUTO: 0.3 % (ref 0–0.9)
LYMPHOCYTES # BLD AUTO: 2.16 K/UL (ref 1–4.8)
LYMPHOCYTES NFR BLD: 36.4 % (ref 22–41)
MCH RBC QN AUTO: 30.8 PG (ref 27–33)
MCHC RBC AUTO-ENTMCNC: 34.6 G/DL (ref 32.3–36.5)
MCV RBC AUTO: 89.1 FL (ref 81.4–97.8)
MONOCYTES # BLD AUTO: 0.55 K/UL (ref 0–0.85)
MONOCYTES NFR BLD AUTO: 9.3 % (ref 0–13.4)
NEUTROPHILS # BLD AUTO: 3.01 K/UL (ref 1.82–7.42)
NEUTROPHILS NFR BLD: 50.8 % (ref 44–72)
NRBC # BLD AUTO: 0 K/UL
NRBC BLD-RTO: 0 /100 WBC (ref 0–0.2)
PLATELET # BLD AUTO: 228 K/UL (ref 164–446)
PMV BLD AUTO: 10.2 FL (ref 9–12.9)
POTASSIUM SERPL-SCNC: 3.7 MMOL/L (ref 3.6–5.5)
PROT SERPL-MCNC: 7.2 G/DL (ref 6–8.2)
RBC # BLD AUTO: 5.16 M/UL (ref 4.7–6.1)
SODIUM SERPL-SCNC: 139 MMOL/L (ref 135–145)
WBC # BLD AUTO: 5.9 K/UL (ref 4.8–10.8)

## 2024-08-03 PROCEDURE — 99283 EMERGENCY DEPT VISIT LOW MDM: CPT

## 2024-08-03 PROCEDURE — 70498 CT ANGIOGRAPHY NECK: CPT

## 2024-08-03 PROCEDURE — 80053 COMPREHEN METABOLIC PANEL: CPT

## 2024-08-03 PROCEDURE — 85025 COMPLETE CBC W/AUTO DIFF WBC: CPT

## 2024-08-03 PROCEDURE — 700117 HCHG RX CONTRAST REV CODE 255: Performed by: STUDENT IN AN ORGANIZED HEALTH CARE EDUCATION/TRAINING PROGRAM

## 2024-08-03 PROCEDURE — 36415 COLL VENOUS BLD VENIPUNCTURE: CPT

## 2024-08-03 PROCEDURE — 70496 CT ANGIOGRAPHY HEAD: CPT

## 2024-08-03 RX ADMIN — IOHEXOL 80 ML: 350 INJECTION, SOLUTION INTRAVENOUS at 03:35

## 2024-08-03 ASSESSMENT — PAIN DESCRIPTION - DESCRIPTORS: DESCRIPTORS: NUMB

## 2024-08-03 ASSESSMENT — FIBROSIS 4 INDEX: FIB4 SCORE: 0.62

## 2024-08-03 NOTE — ED PROVIDER NOTES
ED Provider Note    CHIEF COMPLAINT  Chief Complaint   Patient presents with    Numbness     Pt reports numbness to the Right side of his face, about 1 hour. Pt reports he started feeling very warm, and then he started having numbness and tingling. Pt states the right side of his arm feels different, and it feels pin and needle feeling. Pt states it does not hurt but feels numbness on the right side.     Leg Pain     Pt reports his R leg feels different then his left leg, states it feels swollen.      Cough     Started about 15 minutes ago.        EXTERNAL RECORDS REVIEWED  Previous ER visits for headaches.    HPI/ROS  LIMITATION TO HISTORY   Select: Language Indian,  Used   OUTSIDE HISTORIAN(S):  None    Shandra Dickens is a 38 y.o. male who presents for right-sided facial numbness.  He says symptoms started around 11 PM.  He says that he was just laying in bed when he all of a sudden felt a warm sensation to the right side of his face and numbness and tingling sensation.  He says that he has a heaviness in his arm and his leg does not quite have the same sensory change.  He says it feels 'swollen' and 'heavy'.  He denies any associated speech changes, facial droop, difficulty with gait or coordination.  He has a mild associated headache.  He does report a cough that started started just prior to arrival.  He is not on aspirin or anticoagulation.    PAST MEDICAL HISTORY   has a past medical history of Patient denies medical problems.    SURGICAL HISTORY  patient denies any surgical history    FAMILY HISTORY  History reviewed. No pertinent family history.    SOCIAL HISTORY  Social History     Tobacco Use    Smoking status: Never    Smokeless tobacco: Never   Vaping Use    Vaping status: Never Used   Substance and Sexual Activity    Alcohol use: Never    Drug use: Never    Sexual activity: Not on file       CURRENT MEDICATIONS  Home Medications       Reviewed by Adela Cross R.N.  "(Registered Nurse) on 08/03/24 at 0157  Med List Status: Not Addressed     Medication Last Dose Status        Patient Jacky Taking any Medications                           ALLERGIES  No Known Allergies    PHYSICAL EXAM  VITAL SIGNS: /81   Pulse 60   Temp 36.7 °C (98 °F)   Resp 18   Ht 1.6 m (5' 3\")   Wt 62.2 kg (137 lb 2 oz)   SpO2 97%   BMI 24.29 kg/m²    Constitutional: Awake and alert. Nontoxic  HENT:  Grossly normal  Eyes: Grossly normal  Neck: Normal range of motion  Cardiovascular: Normal heart rate   Thorax & Lungs: No respiratory distress  Abdomen: Nontender  Skin:  No pathologic rash.   Neuro: He is A&O x 4.  No cranial nerve deficit other than subjective sensory change to the right side of the face.  No facial droop.  No pronator drift.  He has full strength to the upper and lower extremities.  He has normal sensation to light touch to the upper and lower extremities.  Extremities: Well perfused  Psychiatric: Affect normal      EKG/LABS  Results for orders placed or performed during the hospital encounter of 08/03/24   CBC WITH DIFFERENTIAL   Result Value Ref Range    WBC 5.9 4.8 - 10.8 K/uL    RBC 5.16 4.70 - 6.10 M/uL    Hemoglobin 15.9 14.0 - 18.0 g/dL    Hematocrit 46.0 42.0 - 52.0 %    MCV 89.1 81.4 - 97.8 fL    MCH 30.8 27.0 - 33.0 pg    MCHC 34.6 32.3 - 36.5 g/dL    RDW 41.6 35.9 - 50.0 fL    Platelet Count 228 164 - 446 K/uL    MPV 10.2 9.0 - 12.9 fL    Neutrophils-Polys 50.80 44.00 - 72.00 %    Lymphocytes 36.40 22.00 - 41.00 %    Monocytes 9.30 0.00 - 13.40 %    Eosinophils 2.70 0.00 - 6.90 %    Basophils 0.50 0.00 - 1.80 %    Immature Granulocytes 0.30 0.00 - 0.90 %    Nucleated RBC 0.00 0.00 - 0.20 /100 WBC    Neutrophils (Absolute) 3.01 1.82 - 7.42 K/uL    Lymphs (Absolute) 2.16 1.00 - 4.80 K/uL    Monos (Absolute) 0.55 0.00 - 0.85 K/uL    Eos (Absolute) 0.16 0.00 - 0.51 K/uL    Baso (Absolute) 0.03 0.00 - 0.12 K/uL    Immature Granulocytes (abs) 0.02 0.00 - 0.11 K/uL    NRBC " (Absolute) 0.00 K/uL   COMP METABOLIC PANEL   Result Value Ref Range    Sodium 139 135 - 145 mmol/L    Potassium 3.7 3.6 - 5.5 mmol/L    Chloride 106 96 - 112 mmol/L    Co2 19 (L) 20 - 33 mmol/L    Anion Gap 14.0 7.0 - 16.0    Glucose 99 65 - 99 mg/dL    Bun 22 8 - 22 mg/dL    Creatinine 0.73 0.50 - 1.40 mg/dL    Calcium 9.6 8.5 - 10.5 mg/dL    Correct Calcium 9.2 8.5 - 10.5 mg/dL    AST(SGOT) 19 12 - 45 U/L    ALT(SGPT) 15 2 - 50 U/L    Alkaline Phosphatase 77 30 - 99 U/L    Total Bilirubin 0.5 0.1 - 1.5 mg/dL    Albumin 4.5 3.2 - 4.9 g/dL    Total Protein 7.2 6.0 - 8.2 g/dL    Globulin 2.7 1.9 - 3.5 g/dL    A-G Ratio 1.7 g/dL   ESTIMATED GFR   Result Value Ref Range    GFR (CKD-EPI) 120 >60 mL/min/1.73 m 2       I have independently interpreted this EKG    RADIOLOGY/PROCEDURES   I have independently interpreted the diagnostic imaging associated with this visit and am waiting the final reading from the radiologist.   My preliminary interpretation is as follows: No obvious bleed or infarct    Radiologist interpretation:  CT-CTA NECK WITH & W/O-POST PROCESSING   Final Result         1.  CT angiogram of the neck within normal limits.         CT-CTA HEAD WITH & W/O-POST PROCESS   Final Result         1.  No large vessel occlusion or aneurysm identified          COURSE & MEDICAL DECISION MAKING    ASSESSMENT, COURSE AND PLAN  Care Narrative: This is a 38-year-old with no chronic medical problems who presents with facial numbness.  On arrival he has normal vital signs and is neurologically intact with an NIH stroke score of 0.  He has no focal neurologic deficit but does report sensory change to the right side of his face with a heaviness sensation in his right arm and leg.  Although he has no acute risk factors for stroke, in an abundance of caution I did think it was indicated to obtain imaging to evaluate for this.  Fortunately, he has no evidence of bleed or large vessel occlusion, stenosis or dissection.  Patient  was reevaluated and does report his symptoms have improved.  He has no other electrolyte derangements or other clear etiology for his symptoms.  He does have an associated headache and possible his symptoms are associated with migraine.  Regardless, I do feel reassured by his workup in the ER and his normal neurologic exam.  I also feel reassured that his symptoms have improved.  I think he is safe for discharge home at this time and he should return to the ER if he has any new or worsening symptoms.  He is able to ambulate without difficulty.  All this was discussed with the patient with a  and he was discharged in good condition    DISPOSITION AND DISCUSSIONS  I have discussed management of the patient with the following physicians and CARRILLO's:  None    Discussion of management with other QHP or appropriate source(s): None    Escalation of care considered, and ultimately not performed:None    Barriers to care at this time, including but not limited to: Patient does not have established PCP.     Decision tools and prescription drugs considered including, but not limited to: NIHSS 0    FINAL DIAGNOSIS  1. Paresthesias Acute        Electronically signed by: Andria Urban M.D., 8/3/2024 3:09 AM

## 2024-08-03 NOTE — ED TRIAGE NOTES
"Chief Complaint   Patient presents with    Numbness     Pt reports numbness to the Right side of his face, about 1 hour. Pt reports he started feeling very warm, and then he started having numbness and tingling. Pt states the right side of his arm feels different, and it feels pin and needle feeling. Pt states it does not hurt but feels numbness on the right side.     Leg Pain     Pt reports his R leg feels different then his left leg, states it feels swollen.      Cough     Started about 15 minutes ago.      38 y.o. male ambulatory to triage for above complaint. Pt states he woke up to drink some milk and a half hour later he said the right side of his body started to feel different. Right sided numbness and pins and needle feeling. His right side feels heavy. Pt also reporting a cough. Denies hx of Stroke. NIH 0. No signs of gait issues, normal motor, no slurred speech or facial droops. Pt has equal strengths on all four extremities. GCS 15.     Urdu Speaking only, ipad  used.     Pt is alert and oriented, speaking in full sentences, follows commands and responds appropriately to questions. NAD. Resp are even and unlabored.      Pt placed in lobby. Pt educated on triage process. Pt encouraged to alert staff for any changes.     Patient and staff wearing appropriate PPE    BP (!) 134/106   Pulse 75   Temp 37.1 °C (98.8 °F) (Temporal)   Resp 18   Ht 1.6 m (5' 3\")   Wt 62.2 kg (137 lb 2 oz)   SpO2 98%   BMI 24.29 kg/m²    "

## 2024-08-11 ENCOUNTER — HOSPITAL ENCOUNTER (EMERGENCY)
Facility: MEDICAL CENTER | Age: 39
End: 2024-08-11
Attending: STUDENT IN AN ORGANIZED HEALTH CARE EDUCATION/TRAINING PROGRAM

## 2024-08-11 ENCOUNTER — APPOINTMENT (OUTPATIENT)
Dept: RADIOLOGY | Facility: MEDICAL CENTER | Age: 39
End: 2024-08-11
Attending: EMERGENCY MEDICINE

## 2024-08-11 VITALS
DIASTOLIC BLOOD PRESSURE: 77 MMHG | BODY MASS INDEX: 24.41 KG/M2 | HEART RATE: 65 BPM | SYSTOLIC BLOOD PRESSURE: 120 MMHG | WEIGHT: 137.79 LBS | RESPIRATION RATE: 18 BRPM | OXYGEN SATURATION: 98 % | TEMPERATURE: 97.7 F | HEIGHT: 63 IN

## 2024-08-11 DIAGNOSIS — R05.1 ACUTE COUGH: ICD-10-CM

## 2024-08-11 DIAGNOSIS — F41.9 ANXIETY: ICD-10-CM

## 2024-08-11 PROCEDURE — 99283 EMERGENCY DEPT VISIT LOW MDM: CPT

## 2024-08-11 PROCEDURE — 71045 X-RAY EXAM CHEST 1 VIEW: CPT

## 2024-08-11 ASSESSMENT — FIBROSIS 4 INDEX: FIB4 SCORE: .7745966692414833771

## 2024-08-11 NOTE — ED NOTES
Discharge instructions given and discussed, signed copy in chart. Pt verbalized understanding and all questions answered. Pt discharged in stable condition on room air. Personal belongings given to patient.

## 2024-08-11 NOTE — ED TRIAGE NOTES
Shandra Contreras Dickens  38 y.o. male  Chief Complaint   Patient presents with    Cough     BIB [facility] from home. Sudden onset of persistent cough about 4 hours ago after drinking water. Aox4, airway intact. Denies aspiration. Now complaining of bilateral carpopedal spasm.      Pt ambulatory to triage with steady gait for above complaint.     Pt is GCS 15, speaking in full sentences, follows commands and responds appropriately to questions.   Pt placed in ED lobby. Pt educated on triage process. Pt encouraged to alert staff for any changes.       Vitals:    08/11/24 0246   BP: 127/78   Pulse: (!) 110   Resp: 20   Temp: 36.1 °C (97 °F)   SpO2: 99%

## 2024-08-11 NOTE — ED PROVIDER NOTES
"ED Provider Note    CHIEF COMPLAINT  Chief Complaint   Patient presents with    Cough     BIB [facility] from home. Sudden onset of persistent cough about 4 hours ago after drinking water. Aox4, airway intact. Denies aspiration. Now complaining of bilateral carpopedal spasm.        EXTERNAL RECORDS REVIEWED  Outpatient Notes patient was seen in the emergency department 8/3/2024 with vague neurologic symptoms including right leg feeling \"different than his left leg\", numbness to the right side of his face associated with numbness and tingling to his lips and face, pins-and-needles to his face arms and legs as well as a cough.  Patient underwent comprehensive laboratory workup including CBC, CMP and CT angiography of the head and neck all of which was unremarkable.    HPI/ROS  LIMITATION TO HISTORY   Select: Language [place]ese,  Used       Shandra Diane Dickens is a 38 y.o. male who presents to the emergency department for evaluation of a sudden onset of a cough after drinking water.  Patient also reports that about this time he became nauseous and then developed some weakness in his hands.  He states that this feels like his hands are heavy and he experiences pain in his wrists when he moves them.  He denies any numbness or weakness of the face, difficulty with his vision, slurring of his words, difficulty walking or numbness or weakness in the legs.  He denies any falls or injuries.  He denies neck pain.  He was feeling normal up until his symptoms started approximately 4 hours ago.    PAST MEDICAL HISTORY   has a past medical history of Patient denies medical problems.    SURGICAL HISTORY  patient denies any surgical history    FAMILY HISTORY  No family history on file.    SOCIAL HISTORY  Social History     Tobacco Use    Smoking status: Never    Smokeless tobacco: Never   Vaping Use    Vaping status: Never Used   Substance and Sexual Activity    Alcohol use: Never    Drug use: Never    Sexual activity: Not " "on file       CURRENT MEDICATIONS  Home Medications       Reviewed by Jairo Lackey R.N. (Registered Nurse) on 08/11/24 at 0306  Med List Status: Partial     Medication Last Dose Status        Patient Jacky Taking any Medications                           ALLERGIES  No Known Allergies    PHYSICAL EXAM  VITAL SIGNS: /77   Pulse 65   Temp 36.5 °C (97.7 °F) (Temporal)   Resp 18   Ht 1.6 m (5' 3\")   Wt 62.5 kg (137 lb 12.6 oz)   SpO2 98%   BMI 24.41 kg/m²    Constitutional: No acute distress, somewhat anxious appearing but pleasant  HEENT: Atraumatic, normocephalic, pupils are equal round reactive to light, nose normal, mouth shows moist mucous membranes  Neck: Supple, no JVD, no tracheal deviation  Cardiovascular: Regular rate and rhythm, no murmur, rub or gallop, 2+ pulses peripherally x4  Thorax & Lungs: No respiratory distress, no wheezes, rales or rhonchi, no chest wall tenderness.  GI: Soft, non-distended, non-tender, no rebound  Skin: Warm, dry, no acute rash or lesion  Musculoskeletal: Moving all extremities, no acute deformity, no edema, no tenderness  Neurologic: A&Ox3, at baseline mentation, ambulatory with a steady gait.  5 out of 5 strength normal sensation throughout upper and lower extremities specifically with  strength, wrist flexion and extension, biceps flexion, triceps extension, arm abduction and adduction.  Normal finger-nose-finger.  No pronator drift.  Psychiatric: Appropriate affect for situation at this time      RADIOLOGY/PROCEDURES   I have independently interpreted the diagnostic imaging associated with this visit and am waiting the final reading from the radiologist.   My preliminary interpretation is as follows: Chest x-ray with no pneumonia, pulmonary edema, pneumothorax    Radiologist interpretation:  DX-CHEST-PORTABLE (1 VIEW)   Final Result      1.  No acute cardiac or pulmonary abnormalities are identified.          COURSE & MEDICAL DECISION MAKING    ASSESSMENT, " COURSE AND PLAN  Care Narrative:     Patient presents to the ER for evaluation of somewhat varied symptoms including nausea, cough and hand heaviness/paresthesia.  This is Alexandru similar to his ER presentation a few days ago at which point he had a comprehensive laboratory and imaging workup that was unremarkable.  Repeat chest x-ray today demonstrating no pneumonia, pneumonitis, pneumothorax or additional abnormality.  At this point I suspect anxiety is the major  of the patient's symptoms and when I bring this up the patient does endorse that he has been feeling very anxious recently, perseverating about symptoms in his body.  Suspect illness anxiety.  I recommended he follow-up with a primary care doctor for follow-up if this persists.  Otherwise at this point he has an NIH of 0 with no neurologic deficits.  He has no evidence of true neurologic disability suggestive of neuromuscular disorder, cervical spinal injury.  I do not feel repeat laboratory or imaging workup is necessary.  Patient reassured and symptoms resolved with such.  He was discharged in stable condition.  Return precautions discussed and all questions answered.      ADDITIONAL PROBLEMS MANAGED  Anxiety    DISPOSITION AND DISCUSSIONS  I have discussed management of the patient with the following physicians and CARRILLO's: None    Discussion of management with other QHP or appropriate source(s): None     Escalation of care considered, and ultimately not performed:Laboratory analysis and diagnostic imaging    Barriers to care at this time, including but not limited to: Patient does not have established PCP.     Decision tools and prescription drugs considered including, but not limited to:  NIH of 0 .    FINAL DIAGNOSIS  1. Acute cough    2. Anxiety         Electronically signed by: Jose Porter M.D., 8/11/2024 3:37 AM

## 2024-10-02 ENCOUNTER — HOSPITAL ENCOUNTER (EMERGENCY)
Facility: MEDICAL CENTER | Age: 39
End: 2024-10-02
Attending: EMERGENCY MEDICINE

## 2024-10-02 VITALS
DIASTOLIC BLOOD PRESSURE: 81 MMHG | HEART RATE: 66 BPM | HEIGHT: 63 IN | RESPIRATION RATE: 18 BRPM | BODY MASS INDEX: 24.45 KG/M2 | OXYGEN SATURATION: 95 % | TEMPERATURE: 98 F | WEIGHT: 138.01 LBS | SYSTOLIC BLOOD PRESSURE: 125 MMHG

## 2024-10-02 DIAGNOSIS — E86.0 DEHYDRATION: ICD-10-CM

## 2024-10-02 DIAGNOSIS — R00.2 PALPITATIONS: ICD-10-CM

## 2024-10-02 DIAGNOSIS — R19.7 DIARRHEA, UNSPECIFIED TYPE: ICD-10-CM

## 2024-10-02 LAB
ALBUMIN SERPL BCP-MCNC: 4.5 G/DL (ref 3.2–4.9)
ALBUMIN/GLOB SERPL: 1.8 G/DL
ALP SERPL-CCNC: 70 U/L (ref 30–99)
ALT SERPL-CCNC: 14 U/L (ref 2–50)
AMORPH CRY #/AREA URNS HPF: PRESENT /HPF
ANION GAP SERPL CALC-SCNC: 12 MMOL/L (ref 7–16)
APPEARANCE UR: ABNORMAL
AST SERPL-CCNC: 22 U/L (ref 12–45)
BACTERIA #/AREA URNS HPF: NEGATIVE /HPF
BASOPHILS # BLD AUTO: 0.6 % (ref 0–1.8)
BASOPHILS # BLD: 0.03 K/UL (ref 0–0.12)
BILIRUB SERPL-MCNC: 0.4 MG/DL (ref 0.1–1.5)
BILIRUB UR QL STRIP.AUTO: NEGATIVE
BUN SERPL-MCNC: 11 MG/DL (ref 8–22)
CALCIUM ALBUM COR SERPL-MCNC: 9.2 MG/DL (ref 8.5–10.5)
CALCIUM SERPL-MCNC: 9.6 MG/DL (ref 8.5–10.5)
CHLORIDE SERPL-SCNC: 107 MMOL/L (ref 96–112)
CO2 SERPL-SCNC: 21 MMOL/L (ref 20–33)
COLOR UR: YELLOW
CREAT SERPL-MCNC: 1.03 MG/DL (ref 0.5–1.4)
EKG IMPRESSION: NORMAL
EOSINOPHIL # BLD AUTO: 0.03 K/UL (ref 0–0.51)
EOSINOPHIL NFR BLD: 0.6 % (ref 0–6.9)
EPI CELLS #/AREA URNS HPF: NEGATIVE /HPF
ERYTHROCYTE [DISTWIDTH] IN BLOOD BY AUTOMATED COUNT: 42.2 FL (ref 35.9–50)
GFR SERPLBLD CREATININE-BSD FMLA CKD-EPI: 96 ML/MIN/1.73 M 2
GLOBULIN SER CALC-MCNC: 2.5 G/DL (ref 1.9–3.5)
GLUCOSE SERPL-MCNC: 97 MG/DL (ref 65–99)
GLUCOSE UR STRIP.AUTO-MCNC: NEGATIVE MG/DL
HCT VFR BLD AUTO: 42.3 % (ref 42–52)
HGB BLD-MCNC: 14.9 G/DL (ref 14–18)
HYALINE CASTS #/AREA URNS LPF: ABNORMAL /LPF
IMM GRANULOCYTES # BLD AUTO: 0.03 K/UL (ref 0–0.11)
IMM GRANULOCYTES NFR BLD AUTO: 0.6 % (ref 0–0.9)
KETONES UR STRIP.AUTO-MCNC: NEGATIVE MG/DL
LEUKOCYTE ESTERASE UR QL STRIP.AUTO: NEGATIVE
LIPASE SERPL-CCNC: 31 U/L (ref 11–82)
LYMPHOCYTES # BLD AUTO: 1.17 K/UL (ref 1–4.8)
LYMPHOCYTES NFR BLD: 23.2 % (ref 22–41)
MCH RBC QN AUTO: 31.4 PG (ref 27–33)
MCHC RBC AUTO-ENTMCNC: 35.2 G/DL (ref 32.3–36.5)
MCV RBC AUTO: 89.1 FL (ref 81.4–97.8)
MICRO URNS: ABNORMAL
MONOCYTES # BLD AUTO: 0.4 K/UL (ref 0–0.85)
MONOCYTES NFR BLD AUTO: 7.9 % (ref 0–13.4)
NEUTROPHILS # BLD AUTO: 3.38 K/UL (ref 1.82–7.42)
NEUTROPHILS NFR BLD: 67.1 % (ref 44–72)
NITRITE UR QL STRIP.AUTO: NEGATIVE
NRBC # BLD AUTO: 0 K/UL
NRBC BLD-RTO: 0 /100 WBC (ref 0–0.2)
PH UR STRIP.AUTO: 7.5 [PH] (ref 5–8)
PLATELET # BLD AUTO: 227 K/UL (ref 164–446)
PMV BLD AUTO: 10.7 FL (ref 9–12.9)
POTASSIUM SERPL-SCNC: 3.9 MMOL/L (ref 3.6–5.5)
PROT SERPL-MCNC: 7 G/DL (ref 6–8.2)
PROT UR QL STRIP: NEGATIVE MG/DL
RBC # BLD AUTO: 4.75 M/UL (ref 4.7–6.1)
RBC # URNS HPF: ABNORMAL /HPF
RBC UR QL AUTO: NEGATIVE
SODIUM SERPL-SCNC: 140 MMOL/L (ref 135–145)
SP GR UR STRIP.AUTO: 1.02
UROBILINOGEN UR STRIP.AUTO-MCNC: 1 MG/DL
WBC # BLD AUTO: 5 K/UL (ref 4.8–10.8)
WBC #/AREA URNS HPF: ABNORMAL /HPF

## 2024-10-02 PROCEDURE — 36415 COLL VENOUS BLD VENIPUNCTURE: CPT

## 2024-10-02 PROCEDURE — 83690 ASSAY OF LIPASE: CPT

## 2024-10-02 PROCEDURE — 81001 URINALYSIS AUTO W/SCOPE: CPT

## 2024-10-02 PROCEDURE — 93005 ELECTROCARDIOGRAM TRACING: CPT | Performed by: EMERGENCY MEDICINE

## 2024-10-02 PROCEDURE — 85025 COMPLETE CBC W/AUTO DIFF WBC: CPT

## 2024-10-02 PROCEDURE — 80053 COMPREHEN METABOLIC PANEL: CPT

## 2024-10-02 PROCEDURE — 99284 EMERGENCY DEPT VISIT MOD MDM: CPT

## 2024-10-02 PROCEDURE — 700105 HCHG RX REV CODE 258: Performed by: EMERGENCY MEDICINE

## 2024-10-02 PROCEDURE — 93005 ELECTROCARDIOGRAM TRACING: CPT

## 2024-10-02 RX ORDER — LOPERAMIDE HYDROCHLORIDE 2 MG/1
2 CAPSULE ORAL 4 TIMES DAILY PRN
Qty: 30 CAPSULE | Refills: 0 | Status: SHIPPED | OUTPATIENT
Start: 2024-10-02

## 2024-10-02 RX ORDER — SODIUM CHLORIDE 9 MG/ML
1000 INJECTION, SOLUTION INTRAVENOUS ONCE
Status: COMPLETED | OUTPATIENT
Start: 2024-10-02 | End: 2024-10-02

## 2024-10-02 RX ADMIN — SODIUM CHLORIDE 1000 ML: 9 INJECTION, SOLUTION INTRAVENOUS at 15:29

## 2024-10-02 ASSESSMENT — FIBROSIS 4 INDEX: FIB4 SCORE: .7745966692414833771

## 2024-10-09 ENCOUNTER — HOSPITAL ENCOUNTER (EMERGENCY)
Facility: MEDICAL CENTER | Age: 39
End: 2024-10-09
Attending: EMERGENCY MEDICINE

## 2024-10-09 VITALS
DIASTOLIC BLOOD PRESSURE: 71 MMHG | HEIGHT: 63 IN | TEMPERATURE: 97.3 F | BODY MASS INDEX: 25.78 KG/M2 | SYSTOLIC BLOOD PRESSURE: 114 MMHG | HEART RATE: 70 BPM | OXYGEN SATURATION: 97 % | WEIGHT: 145.5 LBS | RESPIRATION RATE: 16 BRPM

## 2024-10-09 DIAGNOSIS — R20.2 PARESTHESIAS: ICD-10-CM

## 2024-10-09 DIAGNOSIS — R51.9 ACUTE NONINTRACTABLE HEADACHE, UNSPECIFIED HEADACHE TYPE: ICD-10-CM

## 2024-10-09 PROCEDURE — 700111 HCHG RX REV CODE 636 W/ 250 OVERRIDE (IP): Performed by: EMERGENCY MEDICINE

## 2024-10-09 PROCEDURE — 96374 THER/PROPH/DIAG INJ IV PUSH: CPT

## 2024-10-09 PROCEDURE — 96375 TX/PRO/DX INJ NEW DRUG ADDON: CPT

## 2024-10-09 PROCEDURE — 99284 EMERGENCY DEPT VISIT MOD MDM: CPT

## 2024-10-09 RX ORDER — DIPHENHYDRAMINE HYDROCHLORIDE 50 MG/ML
25 INJECTION INTRAMUSCULAR; INTRAVENOUS ONCE
Status: COMPLETED | OUTPATIENT
Start: 2024-10-09 | End: 2024-10-09

## 2024-10-09 RX ORDER — METOCLOPRAMIDE HYDROCHLORIDE 5 MG/ML
10 INJECTION INTRAMUSCULAR; INTRAVENOUS ONCE
Status: COMPLETED | OUTPATIENT
Start: 2024-10-09 | End: 2024-10-09

## 2024-10-09 RX ORDER — DEXAMETHASONE SODIUM PHOSPHATE 4 MG/ML
12 INJECTION, SOLUTION INTRA-ARTICULAR; INTRALESIONAL; INTRAMUSCULAR; INTRAVENOUS; SOFT TISSUE ONCE
Status: COMPLETED | OUTPATIENT
Start: 2024-10-09 | End: 2024-10-09

## 2024-10-09 RX ADMIN — METOCLOPRAMIDE 10 MG: 5 INJECTION, SOLUTION INTRAMUSCULAR; INTRAVENOUS at 03:31

## 2024-10-09 RX ADMIN — DIPHENHYDRAMINE HYDROCHLORIDE 25 MG: 50 INJECTION, SOLUTION INTRAMUSCULAR; INTRAVENOUS at 03:31

## 2024-10-09 RX ADMIN — DEXAMETHASONE SODIUM PHOSPHATE 12 MG: 4 INJECTION INTRA-ARTICULAR; INTRALESIONAL; INTRAMUSCULAR; INTRAVENOUS; SOFT TISSUE at 03:38

## 2024-10-09 ASSESSMENT — FIBROSIS 4 INDEX: FIB4 SCORE: 0.93

## 2024-10-09 ASSESSMENT — PAIN DESCRIPTION - DESCRIPTORS: DESCRIPTORS: ACHING

## 2024-10-29 ENCOUNTER — HOSPITAL ENCOUNTER (EMERGENCY)
Facility: MEDICAL CENTER | Age: 39
End: 2024-10-29
Attending: EMERGENCY MEDICINE

## 2024-10-29 VITALS
TEMPERATURE: 97.9 F | RESPIRATION RATE: 18 BRPM | HEART RATE: 74 BPM | SYSTOLIC BLOOD PRESSURE: 117 MMHG | DIASTOLIC BLOOD PRESSURE: 78 MMHG | WEIGHT: 145 LBS | BODY MASS INDEX: 25.69 KG/M2 | OXYGEN SATURATION: 95 %

## 2024-10-29 DIAGNOSIS — J06.9 VIRAL UPPER RESPIRATORY TRACT INFECTION: ICD-10-CM

## 2024-10-29 PROCEDURE — 99282 EMERGENCY DEPT VISIT SF MDM: CPT

## 2024-10-29 ASSESSMENT — FIBROSIS 4 INDEX: FIB4 SCORE: 0.93

## 2024-10-29 ASSESSMENT — PAIN DESCRIPTION - PAIN TYPE: TYPE: ACUTE PAIN

## 2024-11-11 ENCOUNTER — HOSPITAL ENCOUNTER (EMERGENCY)
Facility: MEDICAL CENTER | Age: 39
End: 2024-11-11
Attending: STUDENT IN AN ORGANIZED HEALTH CARE EDUCATION/TRAINING PROGRAM

## 2024-11-11 ENCOUNTER — APPOINTMENT (OUTPATIENT)
Dept: RADIOLOGY | Facility: MEDICAL CENTER | Age: 39
End: 2024-11-11
Attending: STUDENT IN AN ORGANIZED HEALTH CARE EDUCATION/TRAINING PROGRAM

## 2024-11-11 VITALS
SYSTOLIC BLOOD PRESSURE: 138 MMHG | BODY MASS INDEX: 26.33 KG/M2 | HEART RATE: 74 BPM | HEIGHT: 63 IN | OXYGEN SATURATION: 96 % | TEMPERATURE: 96.6 F | DIASTOLIC BLOOD PRESSURE: 91 MMHG | WEIGHT: 148.59 LBS | RESPIRATION RATE: 18 BRPM

## 2024-11-11 DIAGNOSIS — R07.89 ATYPICAL CHEST PAIN: ICD-10-CM

## 2024-11-11 LAB
ALBUMIN SERPL BCP-MCNC: 4.4 G/DL (ref 3.2–4.9)
ALBUMIN/GLOB SERPL: 1.8 G/DL
ALP SERPL-CCNC: 65 U/L (ref 30–99)
ALT SERPL-CCNC: 15 U/L (ref 2–50)
ANION GAP SERPL CALC-SCNC: 11 MMOL/L (ref 7–16)
AST SERPL-CCNC: 22 U/L (ref 12–45)
BASOPHILS # BLD AUTO: 0.8 % (ref 0–1.8)
BASOPHILS # BLD: 0.05 K/UL (ref 0–0.12)
BILIRUB SERPL-MCNC: 0.4 MG/DL (ref 0.1–1.5)
BUN SERPL-MCNC: 22 MG/DL (ref 8–22)
CALCIUM ALBUM COR SERPL-MCNC: 8.8 MG/DL (ref 8.5–10.5)
CALCIUM SERPL-MCNC: 9.1 MG/DL (ref 8.5–10.5)
CHLORIDE SERPL-SCNC: 107 MMOL/L (ref 96–112)
CO2 SERPL-SCNC: 20 MMOL/L (ref 20–33)
CREAT SERPL-MCNC: 0.82 MG/DL (ref 0.5–1.4)
EKG IMPRESSION: NORMAL
EOSINOPHIL # BLD AUTO: 0.08 K/UL (ref 0–0.51)
EOSINOPHIL NFR BLD: 1.4 % (ref 0–6.9)
ERYTHROCYTE [DISTWIDTH] IN BLOOD BY AUTOMATED COUNT: 44.3 FL (ref 35.9–50)
GFR SERPLBLD CREATININE-BSD FMLA CKD-EPI: 116 ML/MIN/1.73 M 2
GLOBULIN SER CALC-MCNC: 2.4 G/DL (ref 1.9–3.5)
GLUCOSE SERPL-MCNC: 99 MG/DL (ref 65–99)
HCT VFR BLD AUTO: 45.1 % (ref 42–52)
HGB BLD-MCNC: 15.4 G/DL (ref 14–18)
IMM GRANULOCYTES # BLD AUTO: 0.03 K/UL (ref 0–0.11)
IMM GRANULOCYTES NFR BLD AUTO: 0.5 % (ref 0–0.9)
LYMPHOCYTES # BLD AUTO: 1.22 K/UL (ref 1–4.8)
LYMPHOCYTES NFR BLD: 20.7 % (ref 22–41)
MCH RBC QN AUTO: 31.3 PG (ref 27–33)
MCHC RBC AUTO-ENTMCNC: 34.1 G/DL (ref 32.3–36.5)
MCV RBC AUTO: 91.7 FL (ref 81.4–97.8)
MONOCYTES # BLD AUTO: 0.62 K/UL (ref 0–0.85)
MONOCYTES NFR BLD AUTO: 10.5 % (ref 0–13.4)
NEUTROPHILS # BLD AUTO: 3.9 K/UL (ref 1.82–7.42)
NEUTROPHILS NFR BLD: 66.1 % (ref 44–72)
NRBC # BLD AUTO: 0 K/UL
NRBC BLD-RTO: 0 /100 WBC (ref 0–0.2)
PLATELET # BLD AUTO: 220 K/UL (ref 164–446)
PMV BLD AUTO: 10.5 FL (ref 9–12.9)
POTASSIUM SERPL-SCNC: 3.8 MMOL/L (ref 3.6–5.5)
PROT SERPL-MCNC: 6.8 G/DL (ref 6–8.2)
RBC # BLD AUTO: 4.92 M/UL (ref 4.7–6.1)
SODIUM SERPL-SCNC: 138 MMOL/L (ref 135–145)
TROPONIN T SERPL-MCNC: <6 NG/L (ref 6–19)
WBC # BLD AUTO: 5.9 K/UL (ref 4.8–10.8)

## 2024-11-11 PROCEDURE — 71045 X-RAY EXAM CHEST 1 VIEW: CPT

## 2024-11-11 PROCEDURE — 93005 ELECTROCARDIOGRAM TRACING: CPT

## 2024-11-11 PROCEDURE — 85025 COMPLETE CBC W/AUTO DIFF WBC: CPT

## 2024-11-11 PROCEDURE — 80053 COMPREHEN METABOLIC PANEL: CPT

## 2024-11-11 PROCEDURE — 99283 EMERGENCY DEPT VISIT LOW MDM: CPT

## 2024-11-11 PROCEDURE — 36415 COLL VENOUS BLD VENIPUNCTURE: CPT

## 2024-11-11 PROCEDURE — 93005 ELECTROCARDIOGRAM TRACING: CPT | Performed by: STUDENT IN AN ORGANIZED HEALTH CARE EDUCATION/TRAINING PROGRAM

## 2024-11-11 PROCEDURE — 84484 ASSAY OF TROPONIN QUANT: CPT

## 2024-11-11 ASSESSMENT — HEART SCORE
HEART SCORE: 0
ECG: NORMAL
RISK FACTORS: NO KNOWN RISK FACTORS
TROPONIN: LESS THAN OR EQUAL TO NORMAL LIMIT
AGE: <45
HISTORY: SLIGHTLY SUSPICIOUS

## 2024-11-11 ASSESSMENT — FIBROSIS 4 INDEX: FIB4 SCORE: 0.93

## 2024-11-11 NOTE — ED PROVIDER NOTES
"ER Provider Note    Scribed for Dr. Rusty Osman MD. by Oscar Sanchez. 11/11/2024  1:27 AM    Primary Care Provider: Pcp Pt States None    CHIEF COMPLAINT  Chief Complaint   Patient presents with    Chest Pain     Left, sternal, x 1 hour. Reports shaking/ palpitation sensation all day. Pain is 6:10. - N/V, - SOB.     Pt states he is exposed to gas at work and is concerned it is unsafe for him and causing symptoms. Gas is from a cooking grill. m    Other     Red eyes. Unable to sleep.        EXTERNAL RECORDS REVIEWED      HPI/ROS  LIMITATION TO HISTORY   Select: Language Mosotho,  Used     OUTSIDE HISTORIAN(S):  None    Shandra Dickens is a 38 y.o. male who presents to the ED for evaluation of chest pain onset 5 hours ago. 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 He reports associated sore throat, irritation in his nose, a feeling of \"sand\" in his teeth, and diarrhea earlier today. Patient reports his pain is not exacerbated by pressing on his chest. He denies any shortness of breath. He denies any history of asthma.       PAST MEDICAL HISTORY  Past Medical History:   Diagnosis Date    Patient denies medical problems        SURGICAL HISTORY  History reviewed. No pertinent surgical history.    FAMILY HISTORY  History reviewed. No pertinent family history.    SOCIAL HISTORY   reports that he has quit smoking. His smoking use included cigarettes. He has never used smokeless tobacco. He reports that he does not currently use alcohol. He reports that he does not use drugs.    CURRENT MEDICATIONS  Previous Medications    LOPERAMIDE (IMODIUM) 2 MG CAP    Take 1 Capsule by mouth 4 times a day as needed for Diarrhea.    MEDICATED COOLING (TUCKS) 50 % PADS    Wipe your anus with 1 pad 6 times per day       ALLERGIES  Patient has no known allergies.    PHYSICAL EXAM  BP (!) 138/91   Pulse 69   Temp 35.9 °C (96.6 °F) (Temporal)   Resp 15 " "  Ht 1.6 m (5' 3\")   Wt 67.4 kg (148 lb 9.4 oz)   SpO2 97%   BMI 26.32 kg/m²   Physical Exam  Vitals and nursing note reviewed.   Constitutional:       Appearance: He is well-developed.   HENT:      Head: Normocephalic.   Cardiovascular:      Rate and Rhythm: Normal rate and regular rhythm.      Heart sounds: No murmur heard.  Pulmonary:      Effort: Pulmonary effort is normal.      Breath sounds: Normal breath sounds.   Abdominal:      Palpations: Abdomen is soft.      Tenderness: There is no abdominal tenderness.   Musculoskeletal:         General: Normal range of motion.      Right lower leg: No edema.      Left lower leg: No edema.   Skin:     General: Skin is warm.   Neurological:      General: No focal deficit present.      Mental Status: He is alert and oriented to person, place, and time.         DIAGNOSTIC STUDIES & PROCEDURES    Labs:   Results for orders placed or performed during the hospital encounter of 24   EKG    Collection Time: 24 12:55 AM   Result Value Ref Range    Report       [facility] Emergency Dept.    Test Date:  2024  Pt Name:    CRISTOFER KELLY    Department: ER  MRN:        [id number]                      Room:  Gender:     Male                         Technician: [id number]  :        1985                   Requested By:ER TRIAGE PROTOCOL  Order #:    [id number]                    Reading MD: Rusty Osman    Measurements  Intervals                                Axis  Rate:       69                           P:          59  WV:         166                          QRS:        -17  QRSD:       107                          T:          44  QT:         383  QTc:        411    Interpretive Statements  Sinus rhythm  Borderline left axis deviation  RSR' in V1 or V2, probably normal variant  ST elevation suggests acute pericarditis  Compared to ECG 10/02/2024 12:54:41  RSR' in V1 or V2 now present  ST (T wave) deviation still " present  Electronically Signed On 11- 01:24:35 PST by Rusty Osman     CBC WITH DIFFERENTIAL    Collection Time: 11/11/24  1:54 AM   Result Value Ref Range    WBC 5.9 4.8 - 10.8 K/uL    RBC 4.92 4.70 - 6.10 M/uL    Hemoglobin 15.4 14.0 - 18.0 g/dL    Hematocrit 45.1 42.0 - 52.0 %    MCV 91.7 81.4 - 97.8 fL    MCH 31.3 27.0 - 33.0 pg    MCHC 34.1 32.3 - 36.5 g/dL    RDW 44.3 35.9 - 50.0 fL    Platelet Count 220 164 - 446 K/uL    MPV 10.5 9.0 - 12.9 fL    Neutrophils-Polys 66.10 44.00 - 72.00 %    Lymphocytes 20.70 (L) 22.00 - 41.00 %    Monocytes 10.50 0.00 - 13.40 %    Eosinophils 1.40 0.00 - 6.90 %    Basophils 0.80 0.00 - 1.80 %    Immature Granulocytes 0.50 0.00 - 0.90 %    Nucleated RBC 0.00 0.00 - 0.20 /100 WBC    Neutrophils (Absolute) 3.90 1.82 - 7.42 K/uL    Lymphs (Absolute) 1.22 1.00 - 4.80 K/uL    Monos (Absolute) 0.62 0.00 - 0.85 K/uL    Eos (Absolute) 0.08 0.00 - 0.51 K/uL    Baso (Absolute) 0.05 0.00 - 0.12 K/uL    Immature Granulocytes (abs) 0.03 0.00 - 0.11 K/uL    NRBC (Absolute) 0.00 K/uL   COMP METABOLIC PANEL    Collection Time: 11/11/24  1:54 AM   Result Value Ref Range    Sodium 138 135 - 145 mmol/L    Potassium 3.8 3.6 - 5.5 mmol/L    Chloride 107 96 - 112 mmol/L    Co2 20 20 - 33 mmol/L    Anion Gap 11.0 7.0 - 16.0    Glucose 99 65 - 99 mg/dL    Bun 22 8 - 22 mg/dL    Creatinine 0.82 0.50 - 1.40 mg/dL    Calcium 9.1 8.5 - 10.5 mg/dL    Correct Calcium 8.8 8.5 - 10.5 mg/dL    AST(SGOT) 22 12 - 45 U/L    ALT(SGPT) 15 2 - 50 U/L    Alkaline Phosphatase 65 30 - 99 U/L    Total Bilirubin 0.4 0.1 - 1.5 mg/dL    Albumin 4.4 3.2 - 4.9 g/dL    Total Protein 6.8 6.0 - 8.2 g/dL    Globulin 2.4 1.9 - 3.5 g/dL    A-G Ratio 1.8 g/dL   TROPONIN    Collection Time: 11/11/24  1:54 AM   Result Value Ref Range    Troponin T <6 6 - 19 ng/L   ESTIMATED GFR    Collection Time: 11/11/24  1:54 AM   Result Value Ref Range    GFR (CKD-EPI) 116 >60 mL/min/1.73 m 2      All labs reviewed by me.    EKG:   I have  independently interpreted this EKG.    The cardiac monitor revealed sinus rhythm as interpreted by me. The cardiac monitor was ordered secondary to the patient’s complaint of chest pain and to monitor the patient for dysrhythmia    Radiology:   The attending Emergency Physician has independently interpreted the diagnostic imaging associated with this visit and is awaiting the final reading from the radiologist, which will be displayed below.    Preliminary interpretation is a follows: Chest x-ray no acute process  Radiologist interpretation:    DX-CHEST-PORTABLE (1 VIEW)   Final Result         1.  No acute cardiopulmonary disease.           COURSE & MEDICAL DECISION MAKING    INITIAL ASSESSMENT AND PLAN  Care Narrative:   CHEST PAIN:   HEART Score for Major Cardiac Events  HEART Score     History: Slightly suspicious  ECG: Normal  Age: <45  Risk Factors: No known risk factors  Troponin: Less than or equal to normal limit    Heart Score: 0    Total Score   0-3 Points = Low Score, risk of MACE 0.9-1.7%.  4-6 Points = Moderate Score, risk of MACE 12-16.6%  7-10 Points = High Score, risk of MACE 50-65%      1:27 AM - Patient seen and evaluated at bedside.     ADDITIONAL PROBLEM LIST AND DISPOSITION  Past Medical History:   Diagnosis Date    Patient denies medical problems                   DISPOSITION AND DISCUSSIONS  38-year-old male presenting with atypical chest pain in the absence of dyspnea.  He appears well on exam with normal vital signs.  I have a low suspicion for PE and he is PERC negative at this time therefore further rule out testing was not obtained.  Also low overall suspicion for ACS however additional workup obtained and EKG nonischemic and troponin negative making this diagnosis incredibly unlikely.  Chest x-ray also rules out spontaneous pneumothorax, consolidation represent pneumonia.  Patient for stable for discharge at this time and given return precautions for worsening      I have discussed  management of the patient with the following physicians and CARRILLO's:     Discussion of management with other QHP or appropriate source(s): None     Escalation of care considered, and ultimately not performed: Laboratory analysis.    Barriers to care at this time, including but not limited to: Patient does not have established PCP and Patient does not have insurance.     Decision tools and prescription drugs considered including, but not limited to: .        FINAL IMPRESSION   1. Atypical chest pain         Oscar RIVERA (Norberto), am scribing for, and in the presence of, Rusty Osman M.D..    Electronically signed by: Oscar Sanchez ([email or web address]), 11/11/2024    Rusty RIVERA M.D. personally performed the services described in this documentation, as scribed by Oscar Sanchez in my presence, and it is both accurate and complete.    The note accurately reflects work and decisions made by me.  Rusty Osman M.D.  11/11/2024  3:15 AM

## 2024-11-11 NOTE — ED TRIAGE NOTES
Chief Complaint   Patient presents with    Chest Pain     Left, sternal, x 1 hour. Reports shaking/ palpitation sensation all day. Pain is 6:10. - N/V, - SOB.     Pt states he is exposed to gas at work and is concerned it is unsafe for him and causing symptoms. Gas is from a cooking grill. m    Other     Red eyes. Unable to sleep.     # [id number]    Pt ambulates from lobby with steady gait. Skin signs normal color., warm, dry. Pt speaking full sentences, A & O x 4. Respirations equal and unlabored. Pt educated on triage process and to alert staff of any changing conditions. Pt returned to Triage 2 for EKG.

## 2024-12-09 ENCOUNTER — TELEPHONE (OUTPATIENT)
Dept: HEALTH INFORMATION MANAGEMENT | Facility: OTHER | Age: 39
End: 2024-12-09

## 2024-12-11 ENCOUNTER — APPOINTMENT (OUTPATIENT)
Dept: RADIOLOGY | Facility: MEDICAL CENTER | Age: 39
End: 2024-12-11
Attending: EMERGENCY MEDICINE

## 2024-12-11 ENCOUNTER — HOSPITAL ENCOUNTER (EMERGENCY)
Facility: MEDICAL CENTER | Age: 39
End: 2024-12-11
Attending: EMERGENCY MEDICINE

## 2024-12-11 VITALS
OXYGEN SATURATION: 97 % | BODY MASS INDEX: 25.2 KG/M2 | WEIGHT: 142.2 LBS | DIASTOLIC BLOOD PRESSURE: 77 MMHG | HEIGHT: 63 IN | TEMPERATURE: 97 F | SYSTOLIC BLOOD PRESSURE: 124 MMHG | HEART RATE: 71 BPM | RESPIRATION RATE: 16 BRPM

## 2024-12-11 DIAGNOSIS — R51.9 NONINTRACTABLE HEADACHE, UNSPECIFIED CHRONICITY PATTERN, UNSPECIFIED HEADACHE TYPE: ICD-10-CM

## 2024-12-11 PROCEDURE — 99283 EMERGENCY DEPT VISIT LOW MDM: CPT

## 2024-12-11 PROCEDURE — 70450 CT HEAD/BRAIN W/O DYE: CPT

## 2024-12-11 RX ORDER — KETOROLAC TROMETHAMINE 15 MG/ML
15 INJECTION, SOLUTION INTRAMUSCULAR; INTRAVENOUS ONCE
Status: DISCONTINUED | OUTPATIENT
Start: 2024-12-11 | End: 2024-12-11 | Stop reason: HOSPADM

## 2024-12-11 ASSESSMENT — FIBROSIS 4 INDEX: FIB4 SCORE: 0.96

## 2024-12-11 NOTE — ED PROVIDER NOTES
ED Provider Note      Primary care provider: Pcp Pt States None  Means of arrival: Private vehicle  History obtained from: Patient    CHIEF COMPLAINT  Chief Complaint   Patient presents with    Headache    Blurred Vision       HPI/CHILANGO Devi Diane Dickens is a 39 y.o. male who presents to the Emergency Department for evaluation of headache.  Patient reports for the last 3 years he has had intermittent headaches.  Prior to this he was never ill.  He denies any significant life changes although reports that he believes someone may have put substances in his food 3 years ago that caused him to start having headaches.  He reports he has been to multiple ERs for headaches and typically gets sent home after his headache is treated.  With his headaches he has had blurred vision, dizziness, numbness.  He notes that his symptoms always are different.  He has not followed up with a primary care provider or neurologist.  He notes that yesterday he started to have a headache again with associated blurred vision and therefore came in for further evaluation as it did not resolve.  Denies numbness, tingling weakness.  Denies any eye pain or floaters in his vision.    EXTERNAL RECORDS REVIEWED  Patient has had multiple ER visits for headaches.  He had similar headache on 8/3/2024 and had CTA imaging of the head and neck which were negative.    LIMITATION TO HISTORY   Select: Language Frisian,  Used     OUTSIDE HISTORIAN(S):  None      PAST MEDICAL HISTORY   has a past medical history of Patient denies medical problems.    SURGICAL HISTORY  patient denies any surgical history    SOCIAL HISTORY  Social History     Tobacco Use    Smoking status: Former     Types: Cigarettes    Smokeless tobacco: Never   Vaping Use    Vaping status: Never Used   Substance Use Topics    Alcohol use: Not Currently    Drug use: Never      Social History     Substance and Sexual Activity   Drug Use Never       FAMILY HISTORY  History  "reviewed. No pertinent family history.    CURRENT MEDICATIONS  Home Medications       Reviewed by Adela Cross R.N. (Registered Nurse) on 12/11/24 at 0646  Med List Status: Not Addressed     Medication Last Dose Status   loperamide (IMODIUM) 2 MG Cap  Active   medicated cooling (TUCKS) 50 % Pads  Active                    ALLERGIES  No Known Allergies    PHYSICAL EXAM  VITAL SIGNS: /82   Pulse 82   Temp 36.1 °C (97 °F) (Temporal)   Resp 16   Ht 1.6 m (5' 3\")   Wt 64.5 kg (142 lb 3.2 oz)   SpO2 99%   BMI 25.19 kg/m²   Vitals reviewed by myself.  Physical Exam  Nursing note and vitals reviewed.  Constitutional: Well-developed and well-nourished. No distress.   HENT: Head is normocephalic and atraumatic. Oropharynx is clear and moist without exudate or erythema.   Eyes: Pupils are equal, round, and reactive to light. No horizontal or vertical nystagmus. Conjunctiva are normal.  Visual acuity is 20/50 in the left eye, 20/40 in the right eye, 20/25 in both  Cardiovascular: Normal rate and regular rhythm. No murmur heard. Normal radial pulses.  Pulmonary/Chest: Breath sounds normal. No wheezes or rales.   Abdominal: Soft and non-tender. No distention.    Musculoskeletal: Extremities exhibit normal range of motion without edema or tenderness. Patient ambulates with a normal narrow-based steady gait.   Neurological: Awake, alert and oriented to person, place, and time. No focal deficits noted. Cranial nerves II - XII intact. No pronator drift.  No dysmetria on cerebellar testing. Normal speech and language. Normal strength and sensation in bilateral upper and lower extremities.   Skin: Skin is warm and dry. No rash.   Psychiatric: Normal mood and affect. Appropriate for clinical situation.      DIAGNOSTIC STUDIES:  LABS  Labs Reviewed - No data to display    All labs reviewed and independently interpreted by myself        RADIOLOGY  Images independently interpreted by myself prior to radiologist " review:  -CT of the head demonstrates no acute intracranial abnormalities    Final interpretation by radiology demonstrates:    CT-HEAD W/O   Final Result      Head CT without contrast within normal limits. No evidence of acute cerebral infarction, hemorrhage or mass lesion.                 The radiologist's interpretation of all radiological studies have been reviewed by me.          COURSE & MEDICAL DECISION MAKING      INITIAL ASSESSMENT, ED COURSE AND PLAN    Patient is a 39-year-old male who comes in for evaluation of headache.  Differential diagnosis includes migraine headache, tension headache, acute stress reaction, paranoia, intracranial mass.  On exam patient is well-appearing, neurologically intact on exam.  As he reports that his headache is worse than his prior headaches, I will obtain a repeat CT scan.  I advised patient that if CT is negative today he has had ongoing for headaches for 3 years and has some concern about ingestion of substance 3 years ago that may have brought on his headaches.  I advised him he will need ongoing outpatient follow-up with PCP and neurologist if CT is unremarkable.  He is amenable to this plan.  I will place referrals for outpatient follow-up.    Patient's initial vitals are within normal limits, he is neurologically intact on exam.  Visual acuity is just outside of normal range, likely patient requires glasses at baseline.  There is no significant loss of visual acuity and no eye pain, low suspicion for etiology of his headaches.  Patient is treated with Toradol for his headache.  He feels improved after treatment.  CT returns demonstrates no acute abnormalities.  Therefore patient is reassured and advised on outpatient follow-up for his headache x 3 years.  He is provided with referrals for PCP and neurology and given strict return precautions.  Patient is then discharged in stable condition.      DISPOSITION AND DISCUSSIONS  I have discussed management of the patient  with the following physicians and CARRILLO's:  none    Discussion of management with other Q or appropriate source(s): none     Escalation of care considered, and ultimately not performed:see above    Barriers to care at this time, including but not limited to: none.     Decision tools and prescription drugs considered including, but not limited to: see above.        FINAL IMPRESSION  1. Nonintractable headache, unspecified chronicity pattern, unspecified headache type

## 2024-12-11 NOTE — ED TRIAGE NOTES
"Chief Complaint   Patient presents with    Headache    Blurred Vision     39 y.o. male ambulatory to triage for above complaint. Korean Speaking only -Ipad  used [employer]. Pt reports yesterday was at work and he drank a coke around 1 pm. Pt states he left work around 4pm and he started getting blurred vision. Around 9 pm when he was going to bed he was also having blurred vision. Pt reports he woke up around 0300 with a headache. Pt states this has happened before about a month ago and was seen at [facility]. Pt denies no medical hx. Pt denies dizziness or lightheaded. GCS 15.     Pt is alert and oriented, speaking in full sentences, follows commands and responds appropriately to questions. NAD. Resp are even and unlabored.      Pt placed in lobby. Pt educated on triage process. Pt encouraged to alert staff for any changes.     Patient and staff wearing appropriate PPE    /82   Pulse 82   Temp 36.1 °C (97 °F) (Temporal)   Resp 16   Ht 1.6 m (5' 3\")   Wt 64.5 kg (142 lb 3.2 oz)   SpO2 99%   BMI 25.19 kg/m²    "

## 2025-01-03 ENCOUNTER — TELEPHONE (OUTPATIENT)
Dept: HEALTH INFORMATION MANAGEMENT | Facility: OTHER | Age: 40
End: 2025-01-03